# Patient Record
Sex: FEMALE | Race: WHITE | NOT HISPANIC OR LATINO | Employment: FULL TIME | URBAN - NONMETROPOLITAN AREA
[De-identification: names, ages, dates, MRNs, and addresses within clinical notes are randomized per-mention and may not be internally consistent; named-entity substitution may affect disease eponyms.]

---

## 2024-06-19 ENCOUNTER — APPOINTMENT (OUTPATIENT)
Dept: PRIMARY CARE | Facility: CLINIC | Age: 54
End: 2024-06-19
Payer: COMMERCIAL

## 2024-07-02 ENCOUNTER — APPOINTMENT (OUTPATIENT)
Dept: PRIMARY CARE | Facility: CLINIC | Age: 54
End: 2024-07-02
Payer: COMMERCIAL

## 2024-07-30 ENCOUNTER — HOSPITAL ENCOUNTER (INPATIENT)
Facility: HOSPITAL | Age: 54
LOS: 5 days | Discharge: HOME | End: 2024-08-04
Attending: EMERGENCY MEDICINE | Admitting: STUDENT IN AN ORGANIZED HEALTH CARE EDUCATION/TRAINING PROGRAM
Payer: COMMERCIAL

## 2024-07-30 ENCOUNTER — APPOINTMENT (OUTPATIENT)
Dept: VASCULAR MEDICINE | Facility: HOSPITAL | Age: 54
End: 2024-07-30
Payer: COMMERCIAL

## 2024-07-30 ENCOUNTER — APPOINTMENT (OUTPATIENT)
Dept: CARDIOLOGY | Facility: HOSPITAL | Age: 54
DRG: 299 | End: 2024-07-30
Payer: COMMERCIAL

## 2024-07-30 ENCOUNTER — APPOINTMENT (OUTPATIENT)
Dept: RADIOLOGY | Facility: HOSPITAL | Age: 54
DRG: 299 | End: 2024-07-30
Payer: COMMERCIAL

## 2024-07-30 DIAGNOSIS — I45.5 SINUS PAUSE: ICD-10-CM

## 2024-07-30 DIAGNOSIS — R60.0 LOCALIZED EDEMA: ICD-10-CM

## 2024-07-30 DIAGNOSIS — M79.605 PAIN IN LEFT LEG: ICD-10-CM

## 2024-07-30 DIAGNOSIS — I26.99 OTHER ACUTE PULMONARY EMBOLISM WITHOUT ACUTE COR PULMONALE (MULTI): Primary | ICD-10-CM

## 2024-07-30 DIAGNOSIS — I82.4Y2 ACUTE DEEP VEIN THROMBOSIS (DVT) OF PROXIMAL VEIN OF LEFT LOWER EXTREMITY (MULTI): ICD-10-CM

## 2024-07-30 LAB
ALBUMIN SERPL BCP-MCNC: 3.5 G/DL (ref 3.4–5)
ALBUMIN SERPL BCP-MCNC: 3.7 G/DL (ref 3.4–5)
ALP SERPL-CCNC: 88 U/L (ref 33–110)
ALP SERPL-CCNC: 89 U/L (ref 33–110)
ALT SERPL W P-5'-P-CCNC: 10 U/L (ref 7–45)
ALT SERPL W P-5'-P-CCNC: 11 U/L (ref 7–45)
ANION GAP SERPL CALC-SCNC: 10 MMOL/L (ref 10–20)
ANION GAP SERPL CALC-SCNC: 8 MMOL/L (ref 10–20)
APPEARANCE UR: CLEAR
AST SERPL W P-5'-P-CCNC: 11 U/L (ref 9–39)
AST SERPL W P-5'-P-CCNC: 12 U/L (ref 9–39)
ATRIAL RATE: 79 BPM
BILIRUB SERPL-MCNC: 0.4 MG/DL (ref 0–1.2)
BILIRUB SERPL-MCNC: 0.4 MG/DL (ref 0–1.2)
BILIRUB UR STRIP.AUTO-MCNC: NEGATIVE MG/DL
BNP SERPL-MCNC: 31 PG/ML (ref 0–99)
BUN SERPL-MCNC: 16 MG/DL (ref 6–23)
BUN SERPL-MCNC: 18 MG/DL (ref 6–23)
CALCIUM SERPL-MCNC: 10.7 MG/DL (ref 8.6–10.3)
CALCIUM SERPL-MCNC: 11.3 MG/DL (ref 8.6–10.3)
CARDIAC TROPONIN I PNL SERPL HS: 9 NG/L (ref 0–13)
CHLORIDE SERPL-SCNC: 108 MMOL/L (ref 98–107)
CHLORIDE SERPL-SCNC: 109 MMOL/L (ref 98–107)
CO2 SERPL-SCNC: 20 MMOL/L (ref 21–32)
CO2 SERPL-SCNC: 24 MMOL/L (ref 21–32)
COLOR UR: ABNORMAL
CREAT SERPL-MCNC: 0.92 MG/DL (ref 0.5–1.05)
CREAT SERPL-MCNC: 1.01 MG/DL (ref 0.5–1.05)
EGFRCR SERPLBLD CKD-EPI 2021: 67 ML/MIN/1.73M*2
EGFRCR SERPLBLD CKD-EPI 2021: 75 ML/MIN/1.73M*2
ERYTHROCYTE [DISTWIDTH] IN BLOOD BY AUTOMATED COUNT: 12.5 % (ref 11.5–14.5)
ERYTHROCYTE [DISTWIDTH] IN BLOOD BY AUTOMATED COUNT: 12.7 % (ref 11.5–14.5)
GLUCOSE SERPL-MCNC: 89 MG/DL (ref 74–99)
GLUCOSE SERPL-MCNC: 99 MG/DL (ref 74–99)
GLUCOSE UR STRIP.AUTO-MCNC: NORMAL MG/DL
HCT VFR BLD AUTO: 36.8 % (ref 36–46)
HCT VFR BLD AUTO: 37.4 % (ref 36–46)
HGB BLD-MCNC: 11.8 G/DL (ref 12–16)
HGB BLD-MCNC: 11.9 G/DL (ref 12–16)
INR PPP: 1.3 (ref 0.9–1.1)
KETONES UR STRIP.AUTO-MCNC: NEGATIVE MG/DL
LEUKOCYTE ESTERASE UR QL STRIP.AUTO: ABNORMAL
MAGNESIUM SERPL-MCNC: 1.94 MG/DL (ref 1.6–2.4)
MCH RBC QN AUTO: 29.5 PG (ref 26–34)
MCH RBC QN AUTO: 30 PG (ref 26–34)
MCHC RBC AUTO-ENTMCNC: 31.6 G/DL (ref 32–36)
MCHC RBC AUTO-ENTMCNC: 32.3 G/DL (ref 32–36)
MCV RBC AUTO: 93 FL (ref 80–100)
MCV RBC AUTO: 94 FL (ref 80–100)
MUCOUS THREADS #/AREA URNS AUTO: ABNORMAL /LPF
NITRITE UR QL STRIP.AUTO: NEGATIVE
NRBC BLD-RTO: 0 /100 WBCS (ref 0–0)
NRBC BLD-RTO: 0 /100 WBCS (ref 0–0)
P AXIS: 71 DEGREES
P OFFSET: 163 MS
P ONSET: 119 MS
PH UR STRIP.AUTO: 6.5 [PH]
PLATELET # BLD AUTO: 222 X10*3/UL (ref 150–450)
PLATELET # BLD AUTO: 252 X10*3/UL (ref 150–450)
POTASSIUM SERPL-SCNC: 3.9 MMOL/L (ref 3.5–5.3)
POTASSIUM SERPL-SCNC: 4 MMOL/L (ref 3.5–5.3)
PR INTERVAL: 202 MS
PROT SERPL-MCNC: 6.9 G/DL (ref 6.4–8.2)
PROT SERPL-MCNC: 7.2 G/DL (ref 6.4–8.2)
PROT UR STRIP.AUTO-MCNC: ABNORMAL MG/DL
PROTHROMBIN TIME: 14.7 SECONDS (ref 9.8–12.8)
Q ONSET: 220 MS
QRS COUNT: 12 BEATS
QRS DURATION: 90 MS
QT INTERVAL: 384 MS
QTC CALCULATION(BAZETT): 440 MS
QTC FREDERICIA: 421 MS
R AXIS: 89 DEGREES
RBC # BLD AUTO: 3.97 X10*6/UL (ref 4–5.2)
RBC # BLD AUTO: 4 X10*6/UL (ref 4–5.2)
RBC # UR STRIP.AUTO: ABNORMAL /UL
RBC #/AREA URNS AUTO: >20 /HPF
SODIUM SERPL-SCNC: 135 MMOL/L (ref 136–145)
SODIUM SERPL-SCNC: 136 MMOL/L (ref 136–145)
SP GR UR STRIP.AUTO: 1.03
SQUAMOUS #/AREA URNS AUTO: ABNORMAL /HPF
T AXIS: 66 DEGREES
T OFFSET: 412 MS
UFH PPP CHRO-ACNC: 0.9 IU/ML
UROBILINOGEN UR STRIP.AUTO-MCNC: NORMAL MG/DL
VENTRICULAR RATE: 79 BPM
WBC # BLD AUTO: 7.5 X10*3/UL (ref 4.4–11.3)
WBC # BLD AUTO: 7.8 X10*3/UL (ref 4.4–11.3)
WBC #/AREA URNS AUTO: ABNORMAL /HPF

## 2024-07-30 PROCEDURE — 99222 1ST HOSP IP/OBS MODERATE 55: CPT | Performed by: STUDENT IN AN ORGANIZED HEALTH CARE EDUCATION/TRAINING PROGRAM

## 2024-07-30 PROCEDURE — 85520 HEPARIN ASSAY: CPT | Performed by: STUDENT IN AN ORGANIZED HEALTH CARE EDUCATION/TRAINING PROGRAM

## 2024-07-30 PROCEDURE — 96374 THER/PROPH/DIAG INJ IV PUSH: CPT

## 2024-07-30 PROCEDURE — 1200000002 HC GENERAL ROOM WITH TELEMETRY DAILY

## 2024-07-30 PROCEDURE — 80053 COMPREHEN METABOLIC PANEL: CPT | Performed by: PHYSICIAN ASSISTANT

## 2024-07-30 PROCEDURE — 83036 HEMOGLOBIN GLYCOSYLATED A1C: CPT | Mod: SAMLAB | Performed by: STUDENT IN AN ORGANIZED HEALTH CARE EDUCATION/TRAINING PROGRAM

## 2024-07-30 PROCEDURE — 83880 ASSAY OF NATRIURETIC PEPTIDE: CPT | Performed by: PHYSICIAN ASSISTANT

## 2024-07-30 PROCEDURE — 84484 ASSAY OF TROPONIN QUANT: CPT | Performed by: PHYSICIAN ASSISTANT

## 2024-07-30 PROCEDURE — 36415 COLL VENOUS BLD VENIPUNCTURE: CPT | Performed by: PHYSICIAN ASSISTANT

## 2024-07-30 PROCEDURE — 96361 HYDRATE IV INFUSION ADD-ON: CPT

## 2024-07-30 PROCEDURE — 2500000004 HC RX 250 GENERAL PHARMACY W/ HCPCS (ALT 636 FOR OP/ED): Performed by: PHYSICIAN ASSISTANT

## 2024-07-30 PROCEDURE — 85610 PROTHROMBIN TIME: CPT | Performed by: PHYSICIAN ASSISTANT

## 2024-07-30 PROCEDURE — 93971 EXTREMITY STUDY: CPT | Performed by: INTERNAL MEDICINE

## 2024-07-30 PROCEDURE — 81001 URINALYSIS AUTO W/SCOPE: CPT | Performed by: PHYSICIAN ASSISTANT

## 2024-07-30 PROCEDURE — 2020000001 HC ICU ROOM DAILY

## 2024-07-30 PROCEDURE — 71275 CT ANGIOGRAPHY CHEST: CPT | Performed by: RADIOLOGY

## 2024-07-30 PROCEDURE — 2500000004 HC RX 250 GENERAL PHARMACY W/ HCPCS (ALT 636 FOR OP/ED): Performed by: STUDENT IN AN ORGANIZED HEALTH CARE EDUCATION/TRAINING PROGRAM

## 2024-07-30 PROCEDURE — 83735 ASSAY OF MAGNESIUM: CPT | Performed by: STUDENT IN AN ORGANIZED HEALTH CARE EDUCATION/TRAINING PROGRAM

## 2024-07-30 PROCEDURE — 93971 EXTREMITY STUDY: CPT

## 2024-07-30 PROCEDURE — 2550000001 HC RX 255 CONTRASTS: Performed by: PHYSICIAN ASSISTANT

## 2024-07-30 PROCEDURE — 93005 ELECTROCARDIOGRAM TRACING: CPT

## 2024-07-30 PROCEDURE — 87086 URINE CULTURE/COLONY COUNT: CPT | Mod: SAMLAB | Performed by: PHYSICIAN ASSISTANT

## 2024-07-30 PROCEDURE — 71275 CT ANGIOGRAPHY CHEST: CPT

## 2024-07-30 PROCEDURE — 85027 COMPLETE CBC AUTOMATED: CPT | Performed by: PHYSICIAN ASSISTANT

## 2024-07-30 PROCEDURE — 80053 COMPREHEN METABOLIC PANEL: CPT | Performed by: STUDENT IN AN ORGANIZED HEALTH CARE EDUCATION/TRAINING PROGRAM

## 2024-07-30 PROCEDURE — 99285 EMERGENCY DEPT VISIT HI MDM: CPT | Mod: 25

## 2024-07-30 PROCEDURE — 85027 COMPLETE CBC AUTOMATED: CPT | Performed by: STUDENT IN AN ORGANIZED HEALTH CARE EDUCATION/TRAINING PROGRAM

## 2024-07-30 RX ORDER — CEFTRIAXONE 1 G/50ML
1 INJECTION, SOLUTION INTRAVENOUS EVERY 24 HOURS
Status: DISCONTINUED | OUTPATIENT
Start: 2024-07-30 | End: 2024-07-31

## 2024-07-30 RX ORDER — SODIUM CHLORIDE 9 MG/ML
125 INJECTION, SOLUTION INTRAVENOUS CONTINUOUS
Status: DISCONTINUED | OUTPATIENT
Start: 2024-07-30 | End: 2024-08-01

## 2024-07-30 RX ORDER — IBUPROFEN 200 MG
400 TABLET ORAL EVERY 6 HOURS
COMMUNITY
End: 2024-08-04 | Stop reason: HOSPADM

## 2024-07-30 RX ORDER — TAMSULOSIN HYDROCHLORIDE 0.4 MG/1
0.4 CAPSULE ORAL DAILY
Status: DISCONTINUED | OUTPATIENT
Start: 2024-07-31 | End: 2024-08-04 | Stop reason: HOSPADM

## 2024-07-30 RX ORDER — TAMSULOSIN HYDROCHLORIDE 0.4 MG/1
0.4 CAPSULE ORAL DAILY
COMMUNITY

## 2024-07-30 RX ORDER — POLYETHYLENE GLYCOL 3350 17 G/17G
17 POWDER, FOR SOLUTION ORAL DAILY
Status: DISCONTINUED | OUTPATIENT
Start: 2024-07-30 | End: 2024-08-04 | Stop reason: HOSPADM

## 2024-07-30 RX ORDER — HEPARIN SODIUM 10000 [USP'U]/100ML
0-4500 INJECTION, SOLUTION INTRAVENOUS CONTINUOUS
Status: DISPENSED | OUTPATIENT
Start: 2024-07-30 | End: 2024-08-01

## 2024-07-30 RX ADMIN — CEFTRIAXONE SODIUM 1 G: 1 INJECTION, SOLUTION INTRAVENOUS at 19:21

## 2024-07-30 RX ADMIN — HEPARIN SODIUM 1200 UNITS/HR: 10000 INJECTION, SOLUTION INTRAVENOUS at 16:41

## 2024-07-30 RX ADMIN — IOHEXOL 68 ML: 350 INJECTION, SOLUTION INTRAVENOUS at 15:29

## 2024-07-30 RX ADMIN — SODIUM CHLORIDE 500 ML: 9 INJECTION, SOLUTION INTRAVENOUS at 14:34

## 2024-07-30 RX ADMIN — SODIUM CHLORIDE 125 ML/HR: 9 INJECTION, SOLUTION INTRAVENOUS at 19:21

## 2024-07-30 SDOH — SOCIAL STABILITY: SOCIAL INSECURITY: ARE THERE ANY APPARENT SIGNS OF INJURIES/BEHAVIORS THAT COULD BE RELATED TO ABUSE/NEGLECT?: NO

## 2024-07-30 SDOH — SOCIAL STABILITY: SOCIAL INSECURITY: HAVE YOU HAD THOUGHTS OF HARMING ANYONE ELSE?: NO

## 2024-07-30 SDOH — SOCIAL STABILITY: SOCIAL INSECURITY: ARE YOU OR HAVE YOU BEEN THREATENED OR ABUSED PHYSICALLY, EMOTIONALLY, OR SEXUALLY BY ANYONE?: NO

## 2024-07-30 SDOH — SOCIAL STABILITY: SOCIAL INSECURITY: WERE YOU ABLE TO COMPLETE ALL THE BEHAVIORAL HEALTH SCREENINGS?: YES

## 2024-07-30 SDOH — SOCIAL STABILITY: SOCIAL INSECURITY: DO YOU FEEL UNSAFE GOING BACK TO THE PLACE WHERE YOU ARE LIVING?: NO

## 2024-07-30 SDOH — SOCIAL STABILITY: SOCIAL INSECURITY: HAS ANYONE EVER THREATENED TO HURT YOUR FAMILY OR YOUR PETS?: NO

## 2024-07-30 SDOH — SOCIAL STABILITY: SOCIAL INSECURITY: ABUSE: ADULT

## 2024-07-30 SDOH — SOCIAL STABILITY: SOCIAL INSECURITY: HAVE YOU HAD ANY THOUGHTS OF HARMING ANYONE ELSE?: NO

## 2024-07-30 SDOH — SOCIAL STABILITY: SOCIAL INSECURITY: DO YOU FEEL ANYONE HAS EXPLOITED OR TAKEN ADVANTAGE OF YOU FINANCIALLY OR OF YOUR PERSONAL PROPERTY?: NO

## 2024-07-30 SDOH — SOCIAL STABILITY: SOCIAL INSECURITY: POSSIBLE ABUSE REPORTED TO:: OTHER (COMMENT)

## 2024-07-30 SDOH — HEALTH STABILITY: MENTAL HEALTH: EXPERIENCED ANY OF THE FOLLOWING LIFE EVENTS: OTHER (COMMENT)

## 2024-07-30 SDOH — SOCIAL STABILITY: SOCIAL INSECURITY: DOES ANYONE TRY TO KEEP YOU FROM HAVING/CONTACTING OTHER FRIENDS OR DOING THINGS OUTSIDE YOUR HOME?: NO

## 2024-07-30 ASSESSMENT — COGNITIVE AND FUNCTIONAL STATUS - GENERAL
MOBILITY SCORE: 24
DAILY ACTIVITIY SCORE: 24
MOBILITY SCORE: 24
DAILY ACTIVITIY SCORE: 24
PATIENT BASELINE BEDBOUND: NO

## 2024-07-30 ASSESSMENT — ACTIVITIES OF DAILY LIVING (ADL)
ADEQUATE_TO_COMPLETE_ADL: YES
DRESSING YOURSELF: INDEPENDENT
JUDGMENT_ADEQUATE_SAFELY_COMPLETE_DAILY_ACTIVITIES: YES
HEARING - RIGHT EAR: FUNCTIONAL
TOILETING: INDEPENDENT
WALKS IN HOME: INDEPENDENT
GROOMING: INDEPENDENT
FEEDING YOURSELF: INDEPENDENT
LACK_OF_TRANSPORTATION: NO
TOILETING: INDEPENDENT
PATIENT'S MEMORY ADEQUATE TO SAFELY COMPLETE DAILY ACTIVITIES?: YES
HEARING - LEFT EAR: FUNCTIONAL
GROOMING: INDEPENDENT
BATHING: INDEPENDENT
DRESSING YOURSELF: INDEPENDENT
ADEQUATE_TO_COMPLETE_ADL: YES
WALKS IN HOME: INDEPENDENT
BATHING: INDEPENDENT
FEEDING YOURSELF: INDEPENDENT
PATIENT'S MEMORY ADEQUATE TO SAFELY COMPLETE DAILY ACTIVITIES?: YES
JUDGMENT_ADEQUATE_SAFELY_COMPLETE_DAILY_ACTIVITIES: YES
HEARING - RIGHT EAR: FUNCTIONAL

## 2024-07-30 ASSESSMENT — PAIN - FUNCTIONAL ASSESSMENT
PAIN_FUNCTIONAL_ASSESSMENT: 0-10
PAIN_FUNCTIONAL_ASSESSMENT: 0-10

## 2024-07-30 ASSESSMENT — PAIN SCALES - GENERAL
PAINLEVEL_OUTOF10: 0 - NO PAIN
PAINLEVEL_OUTOF10: 4

## 2024-07-30 ASSESSMENT — LIFESTYLE VARIABLES
HOW OFTEN DO YOU HAVE A DRINK CONTAINING ALCOHOL: NEVER
SUBSTANCE_ABUSE_PAST_12_MONTHS: NO
HOW OFTEN DO YOU HAVE 6 OR MORE DRINKS ON ONE OCCASION: NEVER
AUDIT-C TOTAL SCORE: 0
PRESCIPTION_ABUSE_PAST_12_MONTHS: NO
SKIP TO QUESTIONS 9-10: 1
AUDIT-C TOTAL SCORE: 0
HOW MANY STANDARD DRINKS CONTAINING ALCOHOL DO YOU HAVE ON A TYPICAL DAY: PATIENT DOES NOT DRINK

## 2024-07-30 ASSESSMENT — PAIN DESCRIPTION - LOCATION: LOCATION: LEG

## 2024-07-30 ASSESSMENT — PATIENT HEALTH QUESTIONNAIRE - PHQ9
SUM OF ALL RESPONSES TO PHQ9 QUESTIONS 1 & 2: 0
2. FEELING DOWN, DEPRESSED OR HOPELESS: NOT AT ALL
1. LITTLE INTEREST OR PLEASURE IN DOING THINGS: NOT AT ALL

## 2024-07-30 ASSESSMENT — PAIN DESCRIPTION - FREQUENCY: FREQUENCY: CONSTANT/CONTINUOUS

## 2024-07-30 ASSESSMENT — PAIN DESCRIPTION - ORIENTATION: ORIENTATION: LEFT

## 2024-07-30 ASSESSMENT — PAIN DESCRIPTION - PAIN TYPE: TYPE: ACUTE PAIN

## 2024-07-30 ASSESSMENT — COLUMBIA-SUICIDE SEVERITY RATING SCALE - C-SSRS
6. HAVE YOU EVER DONE ANYTHING, STARTED TO DO ANYTHING, OR PREPARED TO DO ANYTHING TO END YOUR LIFE?: NO
2. HAVE YOU ACTUALLY HAD ANY THOUGHTS OF KILLING YOURSELF?: NO
1. IN THE PAST MONTH, HAVE YOU WISHED YOU WERE DEAD OR WISHED YOU COULD GO TO SLEEP AND NOT WAKE UP?: NO

## 2024-07-30 ASSESSMENT — PAIN DESCRIPTION - DESCRIPTORS: DESCRIPTORS: TIGHTNESS

## 2024-07-30 NOTE — ED PROVIDER NOTES
"aHPI   Chief Complaint   Patient presents with    Leg Pain     Pt is c/o left leg pain and swelling of the calf. The left leg is also warm.        Patient presents with left leg swelling.  She states it is sore in the calf.  She noticed this the day after she got out of the hospital after having a urologic procedure.  Gradually worsening.  States she did feel short of breath a few days ago but none now.  She thought she just had a little bit of allergies.  Patient denies chest pain.  No nauseous or vomiting.  Denies any fall or injury.  States the leg feels \"achy.\"  She states the pain is mostly in the calf but does somewhat radiate up the thigh.      History provided by:  Patient          Patient History   No past medical history on file.  No past surgical history on file.  No family history on file.  Social History     Tobacco Use    Smoking status: Former     Types: Cigarettes    Smokeless tobacco: Never   Substance Use Topics    Alcohol use: Not on file    Drug use: Not on file       Physical Exam   ED Triage Vitals [07/30/24 1334]   Temperature Heart Rate Respirations BP   36.3 °C (97.4 °F) 78 16 132/79      Pulse Ox Temp Source Heart Rate Source Patient Position   97 % Tympanic Monitor Sitting      BP Location FiO2 (%)     Left arm --       Physical Exam  Vitals and nursing note reviewed.   Constitutional:       General: She is not in acute distress.     Appearance: Normal appearance. She is well-developed, well-groomed and normal weight. She is not ill-appearing or toxic-appearing.   HENT:      Head: Normocephalic.      Right Ear: External ear normal.      Left Ear: External ear normal.      Nose: Nose normal.      Mouth/Throat:      Mouth: Mucous membranes are moist.   Eyes:      General: No scleral icterus.     Conjunctiva/sclera: Conjunctivae normal.   Neck:      Vascular: No JVD.   Cardiovascular:      Rate and Rhythm: Normal rate and regular rhythm.      Pulses:           Dorsalis pedis pulses are 2+ on " the right side and 2+ on the left side.        Posterior tibial pulses are 2+ on the right side and 2+ on the left side.      Heart sounds: Normal heart sounds.   Pulmonary:      Effort: Pulmonary effort is normal.      Breath sounds: Normal breath sounds and air entry.   Chest:      Chest wall: No tenderness.   Musculoskeletal:      Left lower leg: Tenderness present. Edema present.        Legs:    Skin:     General: Skin is warm.      Capillary Refill: Capillary refill takes less than 2 seconds.   Neurological:      General: No focal deficit present.      Mental Status: She is alert and oriented to person, place, and time.      Cranial Nerves: No cranial nerve deficit or facial asymmetry.      Sensory: No sensory deficit.      Motor: No weakness.   Psychiatric:         Attention and Perception: Attention and perception normal.         Mood and Affect: Mood and affect normal.         Speech: Speech normal.         Behavior: Behavior normal. Behavior is cooperative.         Thought Content: Thought content normal.         Cognition and Memory: Cognition and memory normal.         Judgment: Judgment normal.           ED Course & MDM   ED Course as of 08/02/24 0912   Tue Jul 30, 2024   1428 +DVT []   1640 Dr joshi [facility] team fellow []   [address] []      ED Course User Index  [] Lenora Olguin PA-C         Diagnoses as of 08/02/24 0912   Other acute pulmonary embolism without acute cor pulmonale (Multi)   Acute deep vein thrombosis (DVT) of proximal vein of left lower extremity (Multi)                       No data recorded                        Medical Decision Making  Patient presents with left leg swelling.  She states it is sore in the calf.  She noticed this the day after she got out of the hospital after having a urologic procedure.  Gradually worsening.  States she did feel short of breath a few days ago but none now.  She thought she just had a little bit of allergies.  Patient denies chest pain.   "No nauseous or vomiting.  Denies any fall or injury.  States the leg feels \"achy.\"  She states the pain is mostly in the calf but does somewhat radiate up the thigh.    Ddx: DVT, strain, sprain, infection, other    Initially started off with an ultrasound to rule out DVT.  Radiologist read this is positive DVT fairly extensive.  This patient did mention that she had been short of breath a few days ago we will obtain a CT of the chest.  Including labs as we will need to start anticoagulants regardless of whether there is a PE.  Unfortunately there was bilateral PE on patient's CT.  No evidence of right heart strain.  Consulted with the PERT team.  Patient is stable for admission here at this hospital and does not meet criteria for procedures.  Heparin drip started.  Consult to hospitalist for admission with acceptance.    The patient was seen by the advanced practice provider.  I have personally performed a substantive portion of the encounter.  I have seen and examined the patient; agree with the workup, evaluation, MDM, management and diagnosis.  The care plan has been discussed.    I personally saw the patient and made/approved the management plan and take responsibility for the patient's management.   History: Left leg swelling and pain.  There is 53-year-old white female presented to the ED due to pain and swelling of her left lower extremity she states that she noted this after having an urologic procedure and states that the swelling and pain has gotten worse since then.  She did complain of an episode of shortness of breath a few days ago but states that that has since resolved.  She denies any chest pain.  She describes the pain of her leg as an achy type pain primarily in the calf but does radiate up into the thigh.  Exam: Gen: Alert and oriented x 3 appears to be no acute distress nontoxic pleasant cooperative to evaluation.  ENT examinations unremarkable.  Neck is supple without meningismus.  Heart " regular rate and rhythm without murmurs.  Lungs are clear to auscultation bilaterally respirations are easy and symmetrical without retractions or tachypnea.  Evaluation left lower extremity with distal pulses are +2/4 and present at the dorsalis pedis and tibialis.  Cap refill less than 2 seconds.  Patient does have some tenderness and edema of the left lower extremity versus the right.  No focal neurologic deficits appreciated.  MDM: Patient was seen and evaluated due to concern for possible deep vein thrombosis.  Patient had venous duplex Doppler performed that was positive for deep vein thrombosis subsequently patient was further worked up for possible pulmonary embolism with blood work and CTA of the chest.  The CTA of the chest did not reveal any evidence of right heart strain and patient was started on heparin drip and admitted to the hospital for treatment  Jorge Jean DO         Problems Addressed:  Acute deep vein thrombosis (DVT) of proximal vein of left lower extremity (Multi): undiagnosed new problem with uncertain prognosis  Other acute pulmonary embolism without acute cor pulmonale (Multi): undiagnosed new problem with uncertain prognosis    Amount and/or Complexity of Data Reviewed  Labs: ordered. Decision-making details documented in ED Course.  Radiology: ordered and independent interpretation performed. Decision-making details documented in ED Course.  ECG/medicine tests: ordered and independent interpretation performed. Decision-making details documented in ED Course.     Details: Read by myself and attending showing normal sinus rhythm at a ventricular rate of 79 bpm.  Normal axis.  No ST segment elevation.  GA interval of 202 with a QT of 384        Procedure  Critical Care    Performed by: Jorge Jean DO  Authorized by: Jorge Jean DO    Critical care provider statement:     Critical care time (minutes):  40    Critical care time was exclusive of:  Separately billable  procedures and treating other patients    Critical care was necessary to treat or prevent imminent or life-threatening deterioration of the following conditions: Pulmonary embolism.    Critical care was time spent personally by me on the following activities:  Discussions with consultants, development of treatment plan with patient or surrogate, examination of patient, evaluation of patient's response to treatment, discussions with primary provider, obtaining history from patient or surrogate, ordering and review of laboratory studies, ordering and review of radiographic studies, pulse oximetry, re-evaluation of patient's condition and review of old charts    Care discussed with: admitting provider         Lenora Olguin PA-C  07/30/24 1657       Jorge Jean,   08/02/24 0912       Jorge Jean,   08/29/24 0700

## 2024-07-30 NOTE — H&P
History Of Present Illness  Jolly Alvarado is a 53 y.o. female with past medical history of recurrent nephrolithiasis, recurrent UTI, ex-smoker 20 years ago, on hormonal injections for menstrual cycle presents with complaints of left calf pain for 1 and half weeks.  Patient says she had like a myalgia like pain that got worsened after a recent trip to [place].  She has been having some episodes of shortness of breast last weekend.  Yesterday with her leg pain was worse and she came to the ER today for further workup.  To be noted patient has been having outpatient kidney stone removal procedures for her recurrent UTI.  She does not have any symptoms of UTI now but says she has been on antibiotics in the last 10 days.  No complaints of chest pain or palpitations.  No hypotension  Oxygen arrival in the ER Patient had a blood pressure of 132/70 mmHg, heart rate 74, respirate 16/min saturating 97% on room air.  Initial investigations showed glucose 99, sodium 136, potassium 4, bicarb 24, anion gap 8, creatinine 1.01, calcium 11.3 [elevated], hemoglobin 11.9, CBC otherwise unremarkable, urinalysis showed leukocyte Estrace and protein.  A CT angio chest was performed shows bilateral extensive pulmonary embolism.  Bibasilar has space disease and pulmonary nodules.  There is also a low-attenuation lesion in the uncinate process of the pancreas.  A lower extremity duplex was done showed left lower extremity acute occlusive deep vein thrombosis in the mid and distal femoral, popliteal and posterior tibial and gastrocnemius veins.  And nonocclusive DVT in proximal femoral and mid femoral peroneal veins.    Past Medical History  Nephrolithiasis  Surgical History  No past surgical history on file.     Social History  She reports that she has quit smoking. Her smoking use included cigarettes. She has never used smokeless tobacco. No history on file for alcohol use and drug use.    Family History  No family history on file.    "  Allergies  Levofloxacin    Review of Systems     Physical Exam  Constitutional:       Appearance: Normal appearance. She is normal weight.   HENT:      Head: Normocephalic and atraumatic.      Right Ear: Tympanic membrane normal.      Nose: Nose normal.      Mouth/Throat:      Mouth: Mucous membranes are moist.   Eyes:      Pupils: Pupils are equal, round, and reactive to light.   Cardiovascular:      Rate and Rhythm: Normal rate.      Pulses: Normal pulses.   Pulmonary:      Effort: Pulmonary effort is normal.   Abdominal:      Palpations: Abdomen is soft.   Musculoskeletal:      Cervical back: Normal range of motion.      Comments: Left calf tenderness   Skin:     General: Skin is dry.   Neurological:      General: No focal deficit present.      Mental Status: She is alert.          Last Recorded Vitals  Blood pressure 139/76, pulse 86, temperature 36.3 °C (97.4 °F), temperature source Tympanic, resp. rate 18, height 1.651 m (5' 5\"), weight 68.5 kg (151 lb), SpO2 100%.    Relevant Results        Scheduled medications  cefTRIAXone, 1 g, intravenous, q24h  polyethylene glycol, 17 g, oral, Daily  tamsulosin, 0.4 mg, oral, Daily      Continuous medications  heparin, 0-4,500 Units/hr, Last Rate: 1,200 Units/hr (07/30/24 1641)  sodium chloride 0.9%, 125 mL/hr      PRN medications  PRN medications: heparin  Results for orders placed or performed during the hospital encounter of 07/30/24 (from the past 24 hour(s))   Protime-INR   Result Value Ref Range    Protime 14.7 (H) 9.8 - 12.8 seconds    INR 1.3 (H) 0.9 - 1.1   Comprehensive metabolic panel   Result Value Ref Range    Glucose 99 74 - 99 mg/dL    Sodium 136 136 - 145 mmol/L    Potassium 4.0 3.5 - 5.3 mmol/L    Chloride 108 (H) 98 - 107 mmol/L    Bicarbonate 24 21 - 32 mmol/L    Anion Gap 8 (L) 10 - 20 mmol/L    Urea Nitrogen 18 6 - 23 mg/dL    Creatinine 1.01 0.50 - 1.05 mg/dL    eGFR 67 >60 mL/min/1.73m*2    Calcium 11.3 (H) 8.6 - 10.3 mg/dL    Albumin 3.7 3.4 - " 5.0 g/dL    Alkaline Phosphatase 88 33 - 110 U/L    Total Protein 7.2 6.4 - 8.2 g/dL    AST 12 9 - 39 U/L    Bilirubin, Total 0.4 0.0 - 1.2 mg/dL    ALT 11 7 - 45 U/L   CBC   Result Value Ref Range    WBC 7.5 4.4 - 11.3 x10*3/uL    nRBC 0.0 0.0 - 0.0 /100 WBCs    RBC 3.97 (L) 4.00 - 5.20 x10*6/uL    Hemoglobin 11.9 (L) 12.0 - 16.0 g/dL    Hematocrit 36.8 36.0 - 46.0 %    MCV 93 80 - 100 fL    MCH 30.0 26.0 - 34.0 pg    MCHC 32.3 32.0 - 36.0 g/dL    RDW 12.7 11.5 - 14.5 %    Platelets 222 150 - 450 x10*3/uL   ECG 12 lead   Result Value Ref Range    Ventricular Rate 79 BPM    Atrial Rate 79 BPM    DE Interval 202 ms    QRS Duration 90 ms    QT Interval 384 ms    QTC Calculation(Bazett) 440 ms    P Axis 71 degrees    R Axis 89 degrees    T Axis 66 degrees    QRS Count 12 beats    Q Onset 220 ms    P Onset 119 ms    P Offset 163 ms    T Offset 412 ms    QTC Fredericia 421 ms   Urinalysis with Reflex Culture and Microscopic   Result Value Ref Range    Color, Urine Light-Yellow Light-Yellow, Yellow, Dark-Yellow    Appearance, Urine Clear Clear    Specific Gravity, Urine 1.035 1.005 - 1.035    pH, Urine 6.5 5.0, 5.5, 6.0, 6.5, 7.0, 7.5, 8.0    Protein, Urine 30 (1+) (A) NEGATIVE, 10 (TRACE), 20 (TRACE) mg/dL    Glucose, Urine Normal Normal mg/dL    Blood, Urine 0.2 (2+) (A) NEGATIVE    Ketones, Urine NEGATIVE NEGATIVE mg/dL    Bilirubin, Urine NEGATIVE NEGATIVE    Urobilinogen, Urine Normal Normal mg/dL    Nitrite, Urine NEGATIVE NEGATIVE    Leukocyte Esterase, Urine 500 Mike/µL (A) NEGATIVE   Microscopic Only, Urine   Result Value Ref Range    WBC, Urine 11-20 (A) 1-5, NONE /HPF    RBC, Urine >20 (A) NONE, 1-2, 3-5 /HPF    Squamous Epithelial Cells, Urine 1-9 (SPARSE) Reference range not established. /HPF    Mucus, Urine FEW Reference range not established. /LPF   B-Type Natriuretic Peptide   Result Value Ref Range    BNP 31 0 - 99 pg/mL   Troponin I, High Sensitivity   Result Value Ref Range    Troponin I, High  Sensitivity 9 0 - 13 ng/L     ECG 12 lead    Result Date: 7/30/2024  Normal sinus rhythm Normal ECG No previous ECGs available    CT angio chest for pulmonary embolism      1. Positive for extensive bilateral pulmonary embolism although no stigmata of ventricular strain. 2. Bibasilar airspace disease.  Correlation with auscultation and inflammatory markers recommended. No pleural effusion. 3. Miniscule pulmonary nodules. 4. Low-attenuation lesion involving the uncinate process of the pancreas not entirely within the field of view. Additional imaging is warranted.   MACRO: Yordan Verduzco discussed the significance and urgency of this critical finding by message with  VANDA GONZALEZ on 7/30/2024 at 4:12 pm. (**-RCF-**) Findings:  See findings.   Signed by: Yordan Verduzco 7/30/2024 4:14 PM Dictation workstation:   [id number]    Lower extremity venous duplex left  Right Lower Venous: The right common femoral vein demonstrates normal spontaneous and respirophasic flow. Left Lower Venous: There is acute occlusive deep vein thrombosis visualized in the mid femoral, distal femoral, popliteal, posterior tibial and gastrocnemius veins. There is acute non-occlusive deep vein thrombosis visualized in the proximal femoral, mid femoral and peroneal veins.  Imaging &      Assessment/Plan   53-year-old female with past medical history of recurrent kidney stones, recurrent UTI, with hormonal injection with deep port preparation presents with acute bilateral extensive pulmonary embolism with acute occlusive DVT of left lower extremity.  Patient also has acute UTI    Plan  Admit to inpatient  Telemetry monitoring  Patient started on IV heparin drip  Cardiology consult  Will obtain echocardiogram  Obtain urine culture  Will start the patient on ceftriaxone 1 g daily  IV hydration  Plan to transition to oral anticoagulants   Patient also informed about other inflammation on the CT scan.  She has some pulmonary nodules but has to be  followed up as outpatient.  There are some achiness in the uncinate process of pancreas recommend to follow-up as outpatient as well    DVT prophylaxis: Heparin  GI prophylaxis: Not required  Full code       I spent 60 minutes in the professional and overall care of this patient.      Cuba Mcbride MD

## 2024-07-30 NOTE — SIGNIFICANT EVENT
"PULMONARY EMBOLISM RESPONSE TEAM (PERT) NOTE    This note represents a summary of a virtual evaluation by the pulmonary embolism response team requested by Lenora FLORES.  Suggestions and impression are summarized from the initial virtual encounter and discussion with the referring medical team. These suggestions supplement but should not be a substitute for bedside evaluation and management by the attending of record.     PERT Members on the Call:  Critical Care Attending: Dr. Us  Critical Care Fellow: Dr. Adames      Brief Clinical Summary:  Jolly Alvarado is a 53 y.o. female with PMH kidney stones with recent ureteral stent placement in mid July, presenting with LLE swelling and pain. She was found to have a LLE femoral vein DVT. Given some SOB 3 days prior to admission (none currently) pt underwent CTPE which revealed bilateral main PA pulmonary emboli. Patient has no respiratory symptoms and remains hemodynamically stable.     Vital Signs  /81 (BP Location: Left arm, Patient Position: Sitting)   Pulse 77   Temp 36.3 °C (97.4 °F) (Tympanic)   Resp 16   Ht 1.651 m (5' 5\")   Wt 67.1 kg (148 lb)   SpO2 100%   BMI 24.63 kg/m²      Laboratory  No results for input(s): \"TROPHS\", \"BNP\", \"LACTATE\" in the last 05803 hours.      PESI Score Star MAO Et al. Arch Intern Med. 2010;[id number].           PESI Score:  52, consistent with JLPESIRISK: Class I, or Very Low risk (0-1.6% 30-day mortality risk) PE.    CT Scan reviewed:  Clot burden moderate, present in bilat main PA  RV/LV ratio 0.8/1 by CT scan    TTE reviewed (if available):  N/A    Venous duplex (if available):  LLE femoral DVT present    Contraindications to anticoagulation: no - on heparin gtt  Contraindications to thrombolytics: no    Initial Impressions:  ERS/ESC Pulmonary Embolism Risk Category: JLPECLASS: Low risk.  Submassive PE    Initial Suggestions/Plans:  Admit to telemetry unit at [facility]  Initial therapy suggested: " heparin gtt.  Additional testing recommended: formal TTE, troponin, BNP, lactate  Consults suggested: local cardiology or pulmonary consult to assist with management of PE.  Additional suggestions for re-evaluation/reconference or retesting: if worsening hemodynamics or respiratory status, please re engage the PERT team.    To re conference the PERT team please call the  Transfer Center at [phone].

## 2024-07-30 NOTE — CARE PLAN
Problem: Skin  Goal: Participates in plan/prevention/treatment measures  Outcome: Progressing  Flowsheets (Taken 7/30/2024 0929)  Participates in plan/prevention/treatment measures: Elevate heels

## 2024-07-31 ENCOUNTER — APPOINTMENT (OUTPATIENT)
Dept: CARDIOLOGY | Facility: HOSPITAL | Age: 54
DRG: 299 | End: 2024-07-31
Payer: COMMERCIAL

## 2024-07-31 ENCOUNTER — APPOINTMENT (OUTPATIENT)
Dept: CARDIOLOGY | Facility: HOSPITAL | Age: 54
End: 2024-07-31
Payer: COMMERCIAL

## 2024-07-31 LAB
ALBUMIN SERPL BCP-MCNC: 3.2 G/DL (ref 3.4–5)
ALP SERPL-CCNC: 78 U/L (ref 33–110)
ALT SERPL W P-5'-P-CCNC: 8 U/L (ref 7–45)
ANION GAP SERPL CALC-SCNC: 8 MMOL/L (ref 10–20)
AORTIC VALVE MEAN GRADIENT: 6 MMHG
AORTIC VALVE PEAK VELOCITY: 1.77 M/S
AST SERPL W P-5'-P-CCNC: 10 U/L (ref 9–39)
ATRIAL RATE: 72 BPM
AV PEAK GRADIENT: 12.5 MMHG
AVA (PEAK VEL): 1.37 CM2
AVA (VTI): 1.4 CM2
BACTERIA UR CULT: NORMAL
BILIRUB SERPL-MCNC: 0.3 MG/DL (ref 0–1.2)
BUN SERPL-MCNC: 15 MG/DL (ref 6–23)
CALCIUM SERPL-MCNC: 10.5 MG/DL (ref 8.6–10.3)
CHLORIDE SERPL-SCNC: 111 MMOL/L (ref 98–107)
CO2 SERPL-SCNC: 20 MMOL/L (ref 21–32)
CREAT SERPL-MCNC: 0.95 MG/DL (ref 0.5–1.05)
EGFRCR SERPLBLD CKD-EPI 2021: 72 ML/MIN/1.73M*2
EJECTION FRACTION APICAL 4 CHAMBER: 62.9
EJECTION FRACTION: 59 %
ERYTHROCYTE [DISTWIDTH] IN BLOOD BY AUTOMATED COUNT: 12.8 % (ref 11.5–14.5)
EST. AVERAGE GLUCOSE BLD GHB EST-MCNC: 100 MG/DL
GLUCOSE SERPL-MCNC: 95 MG/DL (ref 74–99)
HBA1C MFR BLD: 5.1 %
HCT VFR BLD AUTO: 33 % (ref 36–46)
HGB BLD-MCNC: 10.4 G/DL (ref 12–16)
HOLD SPECIMEN: NORMAL
LEFT ATRIUM VOLUME AREA LENGTH INDEX BSA: 16.2 ML/M2
LEFT VENTRICLE INTERNAL DIMENSION DIASTOLE: 3.42 CM (ref 3.5–6)
LEFT VENTRICULAR OUTFLOW TRACT DIAMETER: 1.6 CM
LV EJECTION FRACTION BIPLANE: 59 %
MCH RBC QN AUTO: 29.7 PG (ref 26–34)
MCHC RBC AUTO-ENTMCNC: 31.5 G/DL (ref 32–36)
MCV RBC AUTO: 94 FL (ref 80–100)
MITRAL VALVE E/A RATIO: 0.94
NRBC BLD-RTO: 0 /100 WBCS (ref 0–0)
P AXIS: 58 DEGREES
P OFFSET: 150 MS
P ONSET: 111 MS
PLATELET # BLD AUTO: 205 X10*3/UL (ref 150–450)
POTASSIUM SERPL-SCNC: 3.9 MMOL/L (ref 3.5–5.3)
PR INTERVAL: 218 MS
PROT SERPL-MCNC: 6.1 G/DL (ref 6.4–8.2)
Q ONSET: 220 MS
QRS COUNT: 12 BEATS
QRS DURATION: 84 MS
QT INTERVAL: 394 MS
QTC CALCULATION(BAZETT): 431 MS
QTC FREDERICIA: 418 MS
R AXIS: 49 DEGREES
RBC # BLD AUTO: 3.5 X10*6/UL (ref 4–5.2)
SODIUM SERPL-SCNC: 135 MMOL/L (ref 136–145)
T AXIS: 56 DEGREES
T OFFSET: 417 MS
TRICUSPID ANNULAR PLANE SYSTOLIC EXCURSION: 1.6 CM
UFH PPP CHRO-ACNC: 0.6 IU/ML
UFH PPP CHRO-ACNC: 0.7 IU/ML
VENTRICULAR RATE: 72 BPM
WBC # BLD AUTO: 7.4 X10*3/UL (ref 4.4–11.3)

## 2024-07-31 PROCEDURE — 36415 COLL VENOUS BLD VENIPUNCTURE: CPT | Performed by: STUDENT IN AN ORGANIZED HEALTH CARE EDUCATION/TRAINING PROGRAM

## 2024-07-31 PROCEDURE — 93005 ELECTROCARDIOGRAM TRACING: CPT

## 2024-07-31 PROCEDURE — 93306 TTE W/DOPPLER COMPLETE: CPT

## 2024-07-31 PROCEDURE — 2500000002 HC RX 250 W HCPCS SELF ADMINISTERED DRUGS (ALT 637 FOR MEDICARE OP, ALT 636 FOR OP/ED): Performed by: STUDENT IN AN ORGANIZED HEALTH CARE EDUCATION/TRAINING PROGRAM

## 2024-07-31 PROCEDURE — 85520 HEPARIN ASSAY: CPT | Performed by: STUDENT IN AN ORGANIZED HEALTH CARE EDUCATION/TRAINING PROGRAM

## 2024-07-31 PROCEDURE — 2500000004 HC RX 250 GENERAL PHARMACY W/ HCPCS (ALT 636 FOR OP/ED): Performed by: STUDENT IN AN ORGANIZED HEALTH CARE EDUCATION/TRAINING PROGRAM

## 2024-07-31 PROCEDURE — 1200000002 HC GENERAL ROOM WITH TELEMETRY DAILY

## 2024-07-31 PROCEDURE — 84075 ASSAY ALKALINE PHOSPHATASE: CPT | Performed by: STUDENT IN AN ORGANIZED HEALTH CARE EDUCATION/TRAINING PROGRAM

## 2024-07-31 PROCEDURE — 99223 1ST HOSP IP/OBS HIGH 75: CPT

## 2024-07-31 PROCEDURE — 93306 TTE W/DOPPLER COMPLETE: CPT | Performed by: STUDENT IN AN ORGANIZED HEALTH CARE EDUCATION/TRAINING PROGRAM

## 2024-07-31 PROCEDURE — 99232 SBSQ HOSP IP/OBS MODERATE 35: CPT | Performed by: STUDENT IN AN ORGANIZED HEALTH CARE EDUCATION/TRAINING PROGRAM

## 2024-07-31 PROCEDURE — 93010 ELECTROCARDIOGRAM REPORT: CPT | Performed by: STUDENT IN AN ORGANIZED HEALTH CARE EDUCATION/TRAINING PROGRAM

## 2024-07-31 PROCEDURE — 85027 COMPLETE CBC AUTOMATED: CPT | Performed by: STUDENT IN AN ORGANIZED HEALTH CARE EDUCATION/TRAINING PROGRAM

## 2024-07-31 PROCEDURE — 2500000004 HC RX 250 GENERAL PHARMACY W/ HCPCS (ALT 636 FOR OP/ED): Performed by: PHYSICIAN ASSISTANT

## 2024-07-31 RX ADMIN — TAMSULOSIN HYDROCHLORIDE 0.4 MG: 0.4 CAPSULE ORAL at 09:27

## 2024-07-31 RX ADMIN — HEPARIN SODIUM 1100 UNITS/HR: 10000 INJECTION, SOLUTION INTRAVENOUS at 10:37

## 2024-07-31 RX ADMIN — SODIUM CHLORIDE 125 ML/HR: 9 INJECTION, SOLUTION INTRAVENOUS at 21:20

## 2024-07-31 RX ADMIN — PERFLUTREN 2 ML OF DILUTION: 6.52 INJECTION, SUSPENSION INTRAVENOUS at 04:53

## 2024-07-31 RX ADMIN — SODIUM CHLORIDE 125 ML/HR: 9 INJECTION, SOLUTION INTRAVENOUS at 14:30

## 2024-07-31 RX ADMIN — CEFTRIAXONE SODIUM 1 G: 1 INJECTION, POWDER, FOR SOLUTION INTRAMUSCULAR; INTRAVENOUS at 19:24

## 2024-07-31 ASSESSMENT — PAIN SCALES - GENERAL: PAINLEVEL_OUTOF10: 3

## 2024-07-31 ASSESSMENT — PAIN - FUNCTIONAL ASSESSMENT: PAIN_FUNCTIONAL_ASSESSMENT: 0-10

## 2024-07-31 ASSESSMENT — ACTIVITIES OF DAILY LIVING (ADL): LACK_OF_TRANSPORTATION: NO

## 2024-07-31 NOTE — CARE PLAN
The patient's goals for the shift include feel better    The clinical goals for the shift include will remain hemodynamically stable    Over the shift, the patient did make progress toward the following goals.

## 2024-07-31 NOTE — PROGRESS NOTES
Cardiology Inpatient Progress Note  [facility] [place]    ASSESSMENT AND PLAN  Mrs. Jolly Alvarado is a 53 y.o. former smoker female being consulted by the Cardiology team for DVT and pulmonary embolism. Patient with prior medical history of S/P renal surgery and contraceptive injection, recurrent nephrolithiasis, recurrent UTI, former smoker 20 years ago, on hormonal injections for menstrual cycle presents with complaints of left calf pain for 1.5 week. She states that she developed leg pain on the next day of her surgical procedure. Patient endorsed leg pain that got worsened after a recent trip to [place]. She has been having some episodes of shortness of breast last weekend. One day prior to admission, her leg pain got worse and she came to the ED today for further assessment. To be noted patient has been having outpatient kidney stone removal procedures for her recurrent UTI. She does not have any symptoms of UTI now but says she has been on antibiotics in the last 10 days. She denies chest pain, palpitations, lightheadedness, headaches, fever, chills, orthopnea, paroxysmal nocturnal dyspnea or syncope. A lower extremity duplex was done showed left lower extremity acute occlusive deep vein thrombosis in the mid and distal femoral, popliteal and posterior tibial and gastrocnemius veins. And nonocclusive DVT in proximal femoral and mid femoral peroneal veins. CTA showing bilateral extensive pulmonary embolism. Hemodynamically stable and eupneic on room air. Patient was admitted for clinical compensation.     Acute pulmonary embolism  Acute left lower extremity DVT  -DVT likely provoked, is on the Depo shot and underwent urologic surgery on July 18 which was the day before she developed left lower extremity sharp pain and swelling.  -CTA of chest shows extensive bilateral pulmonary embolism without evidence of RV strain on CT.   -Troponin negative x 1  -Room  air  -Echo pending  -Lower extremity venous duplex study shows acute occlusive DVT in the mid femoral, distal femoral, popliteal, posterior tibial and gastrocnemius veins with acute nonocclusive DVT in the proximal femoral, mid femoral and peroneal veins  -Agree with heparin infusion venous protocol, keep drip until we finalize decisions regarding possible thrombectomy.  She will transition to DOAC prior to discharge  -Discussed intervention options with attending endovascular interventional cardiologist Dr. Emanuel who review imaging and assess candidacy for left lower extremity peripheral thrombectomy which she would have to be transferred to another facility.     Subjective  Patient endorses sharp left leg pain mostly in the calf area, she denies chest pain or pressure or shortness of breath.    Objective:  Intake & Output  Net IO Since Admission: 2,100 mL [07/31/24 0926]    Today's Weight:  Vitals:    07/30/24 1825   Weight: 68.5 kg (151 lb)       PHYSICAL EXAM  Physical Exam  Vitals and nursing note reviewed.   Constitutional:       General: She is not in acute distress.  HENT:      Head: Normocephalic and atraumatic.      Mouth/Throat:      Mouth: Mucous membranes are moist.      Pharynx: Oropharynx is clear.   Eyes:      General: No scleral icterus.     Pupils: Pupils are equal, round, and reactive to light.   Cardiovascular:      Rate and Rhythm: Normal rate and regular rhythm.      Pulses: Normal pulses.      Heart sounds: Normal heart sounds, S1 normal and S2 normal. No murmur heard.     No friction rub.   Pulmonary:      Effort: Pulmonary effort is normal.      Breath sounds: Normal breath sounds.   Abdominal:      General: Bowel sounds are normal. There is no distension.      Palpations: Abdomen is soft.      Tenderness: There is no abdominal tenderness.   Musculoskeletal:         General: Normal range of motion.      Cervical back: Normal range of motion and neck supple.      Right lower leg: No edema.  "     Left lower leg: Edema present.   Skin:     General: Skin is warm and dry.      Capillary Refill: Capillary refill takes less than 2 seconds.      Findings: No rash.   Neurological:      General: No focal deficit present.      Mental Status: She is alert.   Psychiatric:         Mood and Affect: Mood normal.         Behavior: Behavior normal.        Labs:     CMP:  Recent Labs     07/31/24  0447 07/30/24  1846 07/30/24  1432   * 135* 136   K 3.9 3.9 4.0   * 109* 108*   CO2 20* 20* 24   ANIONGAP 8* 10 8*   BUN 15 16 18   CREATININE 0.95 0.92 1.01   EGFR 72 75 67   MG  --  1.94  --      Recent Labs     07/31/24  0447 07/30/24  1846 07/30/24  1432   ALBUMIN 3.2* 3.5 3.7   ALKPHOS 78 89 88   ALT 8 10 11   AST 10 11 12   BILITOT 0.3 0.4 0.4     CBC:  Recent Labs     07/31/24  0447 07/30/24  1846 07/30/24  1432   WBC 7.4 7.8 7.5   HGB 10.4* 11.8* 11.9*   HCT 33.0* 37.4 36.8    252 222   MCV 94 94 93     COAG:   Recent Labs     07/31/24  0447 07/31/24  0101 07/30/24  2102 07/30/24  1432   INR  --   --   --  1.3*   HAUF 0.6 0.7 0.9  --      ABO: No results for input(s): \"ABO\" in the last 21247 hours.  HEME/ENDO:  Recent Labs     07/30/24  1846   HGBA1C 5.1      CARDIAC:   Recent Labs     07/30/24  1719   TROPHS 9   BNP 31   No results for input(s): \"CHOL\", \"LDLF\", \"HDL\", \"TRIG\" in the last 33643 hours.    Inpatient Medications:    Current Facility-Administered Medications:     cefTRIAXone (Rocephin) 1 g in dextrose (iso) IV 50 mL, 1 g, intravenous, q24h, Cuba Mcbride MD, Stopped at 07/30/24 1951    heparin 25,000 Units in dextrose 5% 250 mL (100 Units/mL) infusion (premix), 0-4,500 Units/hr, intravenous, Continuous, Lenora Olguin PA-C, Last Rate: 11 mL/hr at 07/31/24 0544, 1,100 Units/hr at 07/31/24 0544    heparin bolus from bag 2,000-4,000 Units, 2,000-4,000 Units, intravenous, q4h PRN, Lenora Olguin PA-C    polyethylene glycol (Glycolax, Miralax) packet 17 g, 17 g, oral, Daily, Logeswari " MD Reed    sodium chloride 0.9% infusion, 125 mL/hr, intravenous, Continuous, Cuba Mcbride MD, Last Rate: 125 mL/hr at 07/31/24 0544, 125 mL/hr at 07/31/24 0544    tamsulosin (Flomax) 24 hr capsule 0.4 mg, 0.4 mg, oral, Daily, Cuba Mcbride MD     VITALS  Vitals:    07/31/24 0723   BP: 122/75   Pulse: 75   Resp: 16   Temp: 36.8 °C (98.2 °F)   SpO2: 97%       Cardiology will continue to follow.     Thank you for this interesting clinical case and allowing me to participate in the care of this patient.  Please reach me out if you have any questions or if you need any clarifications regarding the patient's care.    **Disclaimer: This note was dictated by speech recognition, and every effort has been made to prevent any error in transcription, however minor errors may be present**  _________________________________________________________  Kris Mccoy, MSN, APRN-CNP, ACNPC-AG, CCRN  Division of Cardiovascular Medicine  [facility] [place]  [facility]

## 2024-07-31 NOTE — TREATMENT PLAN
Spoke with Dr. Emanuel attending with endovascular service.  She reviewed the images and determined that the DVT in the proximal and femoral vein is nonocclusive therefore peripheral thrombectomy is not indicated.  Will continue with anticoagulation with IV heparin for another 24 hours at which point stop the drip with first dose of 10 mg Eliquis twice daily for 7 days then 5 mg twice a day for minimum 6 months.  I will order thigh-high compression stockings for left leg and elevate with 3 pillows to reduce swelling.     Kris Mccoy, MARK  Cardiology

## 2024-07-31 NOTE — PROGRESS NOTES
Jolly Wheeler is a 53 y.o. female on day 1 of admission presenting with Other acute pulmonary embolism without acute cor pulmonale (Multi).      Subjective   Patient seen and examined at bedside.  She is doing pretty well.  This been some sinus pauses and bradycardia the telemetry.  Spoke with cardiology and recommended Holter monitor discharge       Objective     Last Recorded Vitals  /69 (BP Location: Left arm, Patient Position: Lying)   Pulse 72   Temp 37 °C (98.6 °F) (Oral)   Resp 16   Wt 68.5 kg (151 lb)   SpO2 97%   Intake/Output last 3 Shifts:    Intake/Output Summary (Last 24 hours) at 7/31/2024 1247  Last data filed at 7/31/2024 1037  Gross per 24 hour   Intake 2503.86 ml   Output --   Net 2503.86 ml       Admission Weight  Weight: 67.1 kg (148 lb) (07/30/24 1334)    Daily Weight  07/30/24 : 68.5 kg (151 lb)    Image Results  Transthoracic Echo (TTE) [place] [address]  Phone [phone], Fax [phone]    TRANSTHORACIC ECHOCARDIOGRAM REPORT    Patient Name:      JOLLY WHEELER          Reading Physician:    [id number] Mj Almeida MD  Study Date:        7/31/2024             Ordering Provider:    [id number] MUÑOZ  MRN/PID:           [id number]              Fellow:  Accession#:        [id number]          Nurse:                Monica Burnham RN  Date of Birth/Age: 1970 / 53 years Sonographer:          Kacey Garcia RVT, RANCHO  Gender:            F                     Additional Staff:  Height:            165.10 cm             Admit Date:           7/30/2024  Weight:            67.13 kg              Admission Status:     Inpatient -                                                                 Routine  BSA / BMI:          1.74 m2 / 24.63 kg/m2 Department Location:  [address]  Blood Pressure: 129 /76 mmHg    Study Type:    TRANSTHORACIC ECHO (TTE) COMPLETE  Diagnosis/ICD: Other pulmonary embolism without acute cor pulmonale-I26.99  Indication:    PE,SOB  CPT Codes:     Echo Complete w Full Doppler-[id number]    Patient History:  Pertinent History: No previous echo.    Study Detail: The following Echo studies were performed: 2D, M-Mode, Doppler and                color flow. Definity used as a contrast agent for endocardial                border definition. Total contrast used for this procedure was 2 mL                via IV push. A bubble study was not performed. The patient was                awake.       PHYSICIAN INTERPRETATION:  Left Ventricle: The left ventricular systolic function is normal, with a Nash's biplane calculated ejection fraction of 59%. There are no regional wall motion abnormalities. The left ventricular cavity size is normal. Spectral Doppler shows a normal pattern of left ventricular diastolic filling.  Left Atrium: The left atrium is normal in size.  Right Ventricle: The right ventricle is normal in size. There is normal right ventricular global systolic function.  Right Atrium: The right atrium is normal in size.  Aortic Valve: The aortic valve is trileaflet. The aortic valve dimensionless index is 0.70. There is no evidence of aortic valve regurgitation. The peak instantaneous gradient of the aortic valve is 12.5 mmHg. The mean gradient of the aortic valve is 6.0 mmHg.  Mitral Valve: The mitral valve is normal in structure. There is mild mitral valve regurgitation.  Tricuspid Valve: The tricuspid valve is structurally normal. No evidence of tricuspid regurgitation.  Pulmonic Valve: The pulmonic valve is not well visualized. There is no indication of pulmonic valve regurgitation.  Pericardium: There is no pericardial effusion noted.  Aorta: The aortic root is normal.  Systemic Veins: The inferior vena  cava appears to be of normal size. There is IVC inspiratory collapse greater than 50%.       CONCLUSIONS:   1. The left ventricular systolic function is normal, with a Nash's biplane calculated ejection fraction of 59%.   2. There is normal right ventricular global systolic function.    QUANTITATIVE DATA SUMMARY:  2D MEASUREMENTS:                           Normal Ranges:  Ao Root d:     2.00 cm   (2.0-3.7cm)  LAs:           2.30 cm   (2.7-4.0cm)  IVSd:          1.06 cm   (0.6-1.1cm)  LVPWd:         0.98 cm   (0.6-1.1cm)  LVIDd:         3.42 cm   (3.9-5.9cm)  LVIDs:         2.12 cm  LV Mass Index: 59.0 g/m2  LV % FS        38.0 %    LA VOLUME:                                Normal Ranges:  LA Vol A4C:        18.1 ml    (22+/-6mL/m2)  LA Vol A2C:        37.5 ml  LA Vol BP:         28.2 ml  LA Vol Index A4C:  10.4ml/m2  LA Vol Index A2C:  21.5 ml/m2  LA Vol Index BP:   16.2 ml/m2  LA Area A4C:       10.0 cm2  LA Area A2C:       13.3 cm2  LA Major Axis A4C: 4.7 cm  LA Major Axis A2C: 4.0 cm  LA Volume Index:   21.0 ml/m2  LA Vol A4C:        17.2 ml  LA Vol A2C:        36.6 ml    M-MODE MEASUREMENTS:                   Normal Ranges:  AoV Exc: 1.70 cm (1.5-2.5cm)    AORTA MEASUREMENTS:                   Normal Ranges:  AoV Exc: 1.70 cm (1.5-2.5cm)    LV SYSTOLIC FUNCTION BY 2D PLANIMETRY (MOD):                       Normal Ranges:  EF-A4C View:    63 % (>=55%)  EF-A2C View:    57 %  EF-Biplane:     59 %  LV EF Reported: 59 %    LV DIASTOLIC FUNCTION:                          Normal Ranges:  MV Peak E:    1.04 m/s  (0.7-1.2 m/s)  MV Peak A:    1.11 m/s  (0.42-0.7 m/s)  E/A Ratio:    0.94      (1.0-2.2)  MV e'         0.099 m/s (>8.0)  MV lateral e' 0.10 m/s  MV medial e'  0.10 m/s  E/e' Ratio:   10.52     (<8.0)    MITRAL VALVE:                  Normal Ranges:  MV DT: 257 msec (150-240msec)    MITRAL INSUFFICIENCY:                       Normal Ranges:  MR Vmax: 440.00 cm/s    AORTIC VALVE:                                      Normal Ranges:  AoV Vmax:                1.77 m/s  (<=1.7m/s)  AoV Peak P.5 mmHg (<20mmHg)  AoV Mean P.0 mmHg  (1.7-11.5mmHg)  LVOT Max Dao:            1.21 m/s  (<=1.1m/s)  AoV VTI:                 37.70 cm  (18-25cm)  LVOT VTI:                26.30 cm  LVOT Diameter:           1.60 cm   (1.8-2.4cm)  AoV Area, VTI:           1.40 cm2  (2.5-5.5cm2)  AoV Area,Vmax:           1.37 cm2  (2.5-4.5cm2)  AoV Dimensionless Index: 0.70       RIGHT VENTRICLE:  RV Basal 3.04 cm  RV Mid   1.78 cm  RV Major 5.7 cm  TAPSE:   16.3 mm    PULMONIC VALVE:                          Normal Ranges:  PV Accel Time: 85 msec  (>120ms)  PV Max Dao:    1.4 m/s  (0.6-0.9m/s)  PV Max P.6 mmHg       [id number] Mj Almeida MD  Electronically signed on 2024 at 10:37:16 AM       ** Final **  ECG 12 Lead  Sinus rhythm with 1st degree AV block  Otherwise normal ECG  When compared with ECG of 2024 14:34,  No significant change was found      Physical Exam  Constitutional:       Appearance: Normal appearance.   HENT:      Head: Normocephalic and atraumatic.      Right Ear: Tympanic membrane normal.      Nose: Nose normal.      Mouth/Throat:      Mouth: Mucous membranes are moist.   Eyes:      Pupils: Pupils are equal, round, and reactive to light.   Cardiovascular:      Rate and Rhythm: Normal rate and regular rhythm.      Comments: Sinus pauses and bradycardia  Pulmonary:      Effort: Pulmonary effort is normal.   Abdominal:      Palpations: Abdomen is soft.   Musculoskeletal:      Cervical back: Normal range of motion.      Comments: Left calf pain   Neurological:      General: No focal deficit present.      Mental Status: She is alert and oriented to person, place, and time.   Psychiatric:         Mood and Affect: Mood normal.         Relevant Results               Assessment/Plan      53-year-old female with past medical history of recurrent kidney stones, recurrent UTI, with hormonal  injection with deep port preparation presents with acute bilateral extensive pulmonary embolism with acute occlusive DVT of left lower extremity.  Patient also has acute UTI     Assessment  Acute left lower extremity DVT  Acute submassive pulmonary embolism  Anemia  Acute urinary tract infection  Recurrent nephrolithiasis  Ex-smoker    Plan    Continue current ongoing care  Continue IV heparin drip  Spoke to the pharmacy regarding transition to Eliquis tonight  Echocardiogram reviewed  Patient had bradycardia and sinus pauses.  Reviewed with cardiology recommend Holter monitor discharge  Per cardiology patient has extensive lower extremity thrombus and would benefit from thrombectomy, decision still pending.  Continue IV antibiotics with ceftriaxone  Will stop IV fluids encourage oral hydration  Follow-up for pulmonary nodules as outpatient  Follow-up on pancreatic lesion as outpatient as well  DVT prophylaxis: Heparin  GI prophylaxis: Not required  Full code          Total time spent 35 minutes       Cuba Mcbride MD

## 2024-07-31 NOTE — PROGRESS NOTES
07/31/24 1230   Discharge Planning   Living Arrangements Spouse/significant other   Support Systems Spouse/significant other   Assistance Needed none   Type of Residence Private residence   Number of Stairs to Enter Residence 6   Number of Stairs Within Residence 0   Do you have animals or pets at home? Yes   Type of Animals or Pets 1dog   Who is requesting discharge planning? Patient   Home or Post Acute Services None   Expected Discharge Disposition Home   Does the patient need discharge transport arranged? No   Financial Resource Strain   How hard is it for you to pay for the very basics like food, housing, medical care, and heating? Not hard   Housing Stability   In the last 12 months, was there a time when you were not able to pay the mortgage or rent on time? N   In the past 12 months, how many times have you moved where you were living? 1   At any time in the past 12 months, were you homeless or living in a shelter (including now)? N   Transportation Needs   In the past 12 months, has lack of transportation kept you from medical appointments or from getting medications? no   In the past 12 months, has lack of transportation kept you from meetings, work, or from getting things needed for daily living? No     Care Transitions: Spoke with pt at bedside verified demographics and insurance information. Pt is from home with her  and feels safe. She is a new pt to [facility] and [facility] She is independent and uses no DME or oxygen at home and she is not diabetic. Pt plans to return home with  at discharge and denies needs at this time. [facility] 24/24. ADOD is 24hrs per IDR this morning. CT to follow. Oumou HUBBARDN/RN-TCC

## 2024-07-31 NOTE — CARE PLAN
Problem: Skin  Goal: Decreased wound size/increased tissue granulation at next dressing change  Outcome: Progressing  Goal: Participates in plan/prevention/treatment measures  Outcome: Progressing  Goal: Prevent/manage excess moisture  Outcome: Progressing  Goal: Prevent/minimize sheer/friction injuries  Outcome: Progressing  Flowsheets (Taken 7/30/2024 0171)  Prevent/minimize sheer/friction injuries: Use pull sheet  Goal: Promote/optimize nutrition  Outcome: Progressing  Goal: Promote skin healing  Outcome: Progressing   The patient's goals for the shift include feel better    The clinical goals for the shift include feel better

## 2024-07-31 NOTE — CONSULTS
Inpatient consult to Cardiology  Consult performed by: Zion Tao MD  Consult ordered by: Cuba Mcbride MD  Reason for consult: pulmonary embolism      History Of Present Illness:    Mrs. Jolly Alvarado is a 53 y.o. former smoker female being consulted by the Cardiology team for DVT and pulmonary embolism. Patient with prior medical history of S/P renal surgery and contraceptive injection, recurrent nephrolithiasis, recurrent UTI, former smoker 20 years ago, on hormonal injections for menstrual cycle presents with complaints of left calf pain for 1.5 week. She states that she developed leg pain on the next day of her surgical procedure. Patient endorsed leg pain that got worsened after a recent trip to [place]. She has been having some episodes of shortness of breast last weekend. One day prior to admission, her leg pain got worse and she came to the ED today for further assessment. To be noted patient has been having outpatient kidney stone removal procedures for her recurrent UTI. She does not have any symptoms of UTI now but says she has been on antibiotics in the last 10 days. She denies chest pain, palpitations, lightheadedness, headaches, fever, chills, orthopnea, paroxysmal nocturnal dyspnea or syncope. A lower extremity duplex was done showed left lower extremity acute occlusive deep vein thrombosis in the mid and distal femoral, popliteal and posterior tibial and gastrocnemius veins. And nonocclusive DVT in proximal femoral and mid femoral peroneal veins. CTA showing bilateral extensive pulmonary embolism. Hemodynamically stable and eupneic on room air. Patient was admitted for clinical compensation.     Last Recorded Vitals:  Vitals:    07/30/24 1436 07/30/24 1600 07/30/24 1649 07/30/24 1825   BP: 131/70 118/71 138/81 139/76   BP Location: Left arm Left arm Left arm Left arm   Patient Position: Sitting Sitting Sitting Sitting   Pulse: 78 70 77 86   Resp: 16 16 16 18   Temp:      "  Temrc:       SpO2: 96% 95% 100% 100%   Weight:    68.5 kg (151 lb)   Height:    1.651 m (5' 5\")       Last Labs:  CBC - 7/30/2024:  6:46 PM  7.8 11.8 252    37.4      CMP - 7/30/2024:  6:46 PM  10.7 6.9 11 --- 0.4   _ 3.5 10 89      PTT - No results in last year.  1.3   14.7 _     Troponin I, High Sensitivity   Date/Time Value Ref Range Status   07/30/2024 05:19 PM 9 0 - 13 ng/L Final     BNP   Date/Time Value Ref Range Status   07/30/2024 05:19 PM 31 0 - 99 pg/mL Final      Last I/O:  I/O last 3 completed shifts:  In: 500 (7.3 mL/kg) [IV Piggyback:500]  Out: - (0 mL/kg)   Weight: 68.5 kg     Past Cardiology Tests (Last 3 Years):  EKG:  ECG 12 lead 07/30/2024 (Preliminary)    Echo:  No results found for this or any previous visit from the past 1095 days.    Ejection Fractions:  No results found for: \"EF\"  Cath:  No results found for this or any previous visit from the past 1095 days.    Stress Test:  No results found for this or any previous visit from the past 1095 days.    Cardiac Imaging:  No results found for this or any previous visit from the past 1095 days.      Past Medical History:  She has no past medical history on file.    Past Surgical History:  She has no past surgical history on file.      Social History:  She reports that she has quit smoking. Her smoking use included cigarettes. She has never used smokeless tobacco. No history on file for alcohol use and drug use.    Family History:  No family history on file.     Allergies:  Levofloxacin    Inpatient Medications:  Scheduled medications   Medication Dose Route Frequency    cefTRIAXone  1 g intravenous q24h    polyethylene glycol  17 g oral Daily    [START ON 7/31/2024] tamsulosin  0.4 mg oral Daily     PRN medications   Medication    heparin     Continuous Medications   Medication Dose Last Rate    heparin  0-4,500 Units/hr 1,200 Units/hr (07/30/24 1641)    sodium chloride 0.9%  125 mL/hr 125 mL/hr (07/30/24 1921)     Outpatient " Medications:  Current Outpatient Medications   Medication Instructions    ibuprofen 400 mg, oral, Every 6 hours    tamsulosin (FLOMAX) 0.4 mg, oral, Daily       Physical Exam:  General: alert, oriented and in no acute distress  HEENT: NC/AT; EOMI; PERRLA, external ear is normal  Neck: supple; trachea midline; no masses; no JVD  Chest: clear breath sounds bilaterally; no wheezing  Cardio: regular rhythm, S1S2 normal, no murmurs  Abdomen: Soft, non-tender, non-distension, no organomegaly  Extremities: no clubbing/cyanosis/edema  Neuro: Grossly intact     Psychiatric: Normal mood and affect      Assessment/Plan     Mrs. Jolly Alvarado is a 53 y.o. former smoker female being consulted by the Cardiology team for DVT and pulmonary embolism. Patient with prior medical history of S/P renal surgery and contraceptive injection, recurrent nephrolithiasis, recurrent UTI, former smoker 20 years ago, on hormonal injections for menstrual cycle presents with complaints of left calf pain for 1.5 week. She states that she developed leg pain on the next day of her surgical procedure. Patient endorsed leg pain that got worsened after a recent trip to [place]. She has been having some episodes of shortness of breast last weekend. One day prior to admission, her leg pain got worse and she came to the ED today for further assessment. To be noted patient has been having outpatient kidney stone removal procedures for her recurrent UTI. She does not have any symptoms of UTI now but says she has been on antibiotics in the last 10 days. She denies chest pain, palpitations, lightheadedness, headaches, fever, chills, orthopnea, paroxysmal nocturnal dyspnea or syncope. A lower extremity duplex was done showed left lower extremity acute occlusive deep vein thrombosis in the mid and distal femoral, popliteal and posterior tibial and gastrocnemius veins. And nonocclusive DVT in proximal femoral and mid femoral peroneal veins. CTA showing bilateral  extensive pulmonary embolism. Hemodynamically stable and eupneic on room air. Patient was admitted for clinical compensation.    Assessment    # DVT / Pulmonary embolism  - Patient is eupneic, hemodynamically stable on room air.  - CTA showing pulmonary embolism.  - Would suggest to keep anticoagulation - patient is heparinized IV drip.  - Would suggest echocardiogram.  - Before patient is discharged home, would suggest to switch anticoagulation to Eliquis 10mg BID.  - Follow up in Cardiology clinic.    Thank you for allowing me to participate in the care of this patient. Please reach me out if you have any questions or if you need any clarifications regarding the patient's care.       Peripheral IV 07/30/24 20 G Right Antecubital (Active)   Site Assessment Clean;Dry;Intact 07/30/24 1433   Dressing Type Securing device;Transparent;Tape 07/30/24 1433   Line Status Blood return noted;Flushed 07/30/24 1433   Dressing Status Clean;Dry;Occlusive 07/30/24 1433   Number of days: 0       Code Status:  Full Code    Zion Tao MD  Cardiology

## 2024-08-01 LAB
ANION GAP SERPL CALC-SCNC: 8 MMOL/L (ref 10–20)
ATRIAL RATE: 63 BPM
BUN SERPL-MCNC: 9 MG/DL (ref 6–23)
CALCIUM SERPL-MCNC: 10.6 MG/DL (ref 8.6–10.3)
CHLORIDE SERPL-SCNC: 115 MMOL/L (ref 98–107)
CO2 SERPL-SCNC: 19 MMOL/L (ref 21–32)
CREAT SERPL-MCNC: 0.91 MG/DL (ref 0.5–1.05)
EGFRCR SERPLBLD CKD-EPI 2021: 76 ML/MIN/1.73M*2
ERYTHROCYTE [DISTWIDTH] IN BLOOD BY AUTOMATED COUNT: 12.8 % (ref 11.5–14.5)
GLUCOSE SERPL-MCNC: 95 MG/DL (ref 74–99)
HCT VFR BLD AUTO: 33.4 % (ref 36–46)
HGB BLD-MCNC: 10.5 G/DL (ref 12–16)
MCH RBC QN AUTO: 29.8 PG (ref 26–34)
MCHC RBC AUTO-ENTMCNC: 31.4 G/DL (ref 32–36)
MCV RBC AUTO: 95 FL (ref 80–100)
NRBC BLD-RTO: 0 /100 WBCS (ref 0–0)
P AXIS: 67 DEGREES
P OFFSET: 145 MS
P ONSET: 102 MS
PLATELET # BLD AUTO: 214 X10*3/UL (ref 150–450)
POTASSIUM SERPL-SCNC: 4 MMOL/L (ref 3.5–5.3)
PR INTERVAL: 238 MS
Q ONSET: 221 MS
QRS COUNT: 11 BEATS
QRS DURATION: 82 MS
QT INTERVAL: 396 MS
QTC CALCULATION(BAZETT): 405 MS
QTC FREDERICIA: 402 MS
R AXIS: 57 DEGREES
RBC # BLD AUTO: 3.52 X10*6/UL (ref 4–5.2)
SODIUM SERPL-SCNC: 138 MMOL/L (ref 136–145)
T AXIS: 58 DEGREES
T OFFSET: 419 MS
UFH PPP CHRO-ACNC: 0.5 IU/ML
VENTRICULAR RATE: 63 BPM
WBC # BLD AUTO: 6.2 X10*3/UL (ref 4.4–11.3)

## 2024-08-01 PROCEDURE — 85520 HEPARIN ASSAY: CPT | Performed by: STUDENT IN AN ORGANIZED HEALTH CARE EDUCATION/TRAINING PROGRAM

## 2024-08-01 PROCEDURE — 2500000004 HC RX 250 GENERAL PHARMACY W/ HCPCS (ALT 636 FOR OP/ED): Performed by: STUDENT IN AN ORGANIZED HEALTH CARE EDUCATION/TRAINING PROGRAM

## 2024-08-01 PROCEDURE — 2500000002 HC RX 250 W HCPCS SELF ADMINISTERED DRUGS (ALT 637 FOR MEDICARE OP, ALT 636 FOR OP/ED): Performed by: STUDENT IN AN ORGANIZED HEALTH CARE EDUCATION/TRAINING PROGRAM

## 2024-08-01 PROCEDURE — 36415 COLL VENOUS BLD VENIPUNCTURE: CPT | Performed by: STUDENT IN AN ORGANIZED HEALTH CARE EDUCATION/TRAINING PROGRAM

## 2024-08-01 PROCEDURE — 99232 SBSQ HOSP IP/OBS MODERATE 35: CPT | Performed by: STUDENT IN AN ORGANIZED HEALTH CARE EDUCATION/TRAINING PROGRAM

## 2024-08-01 PROCEDURE — 2500000001 HC RX 250 WO HCPCS SELF ADMINISTERED DRUGS (ALT 637 FOR MEDICARE OP)

## 2024-08-01 PROCEDURE — 1200000002 HC GENERAL ROOM WITH TELEMETRY DAILY

## 2024-08-01 PROCEDURE — 80048 BASIC METABOLIC PNL TOTAL CA: CPT | Performed by: STUDENT IN AN ORGANIZED HEALTH CARE EDUCATION/TRAINING PROGRAM

## 2024-08-01 PROCEDURE — 2500000004 HC RX 250 GENERAL PHARMACY W/ HCPCS (ALT 636 FOR OP/ED)

## 2024-08-01 PROCEDURE — 85027 COMPLETE CBC AUTOMATED: CPT | Performed by: STUDENT IN AN ORGANIZED HEALTH CARE EDUCATION/TRAINING PROGRAM

## 2024-08-01 RX ADMIN — CEFTRIAXONE SODIUM 1 G: 1 INJECTION, POWDER, FOR SOLUTION INTRAMUSCULAR; INTRAVENOUS at 18:26

## 2024-08-01 RX ADMIN — TAMSULOSIN HYDROCHLORIDE 0.4 MG: 0.4 CAPSULE ORAL at 09:10

## 2024-08-01 RX ADMIN — HEPARIN SODIUM 1100 UNITS/HR: 10000 INJECTION, SOLUTION INTRAVENOUS at 07:34

## 2024-08-01 RX ADMIN — SODIUM CHLORIDE 125 ML/HR: 9 INJECTION, SOLUTION INTRAVENOUS at 12:55

## 2024-08-01 RX ADMIN — SODIUM CHLORIDE 125 ML/HR: 9 INJECTION, SOLUTION INTRAVENOUS at 05:39

## 2024-08-01 RX ADMIN — APIXABAN 10 MG: 5 TABLET, FILM COATED ORAL at 20:59

## 2024-08-01 ASSESSMENT — PAIN SCALES - GENERAL
PAINLEVEL_OUTOF10: 0 - NO PAIN
PAINLEVEL_OUTOF10: 0 - NO PAIN

## 2024-08-01 ASSESSMENT — PAIN - FUNCTIONAL ASSESSMENT: PAIN_FUNCTIONAL_ASSESSMENT: 0-10

## 2024-08-01 ASSESSMENT — ACTIVITIES OF DAILY LIVING (ADL): LACK_OF_TRANSPORTATION: NO

## 2024-08-01 NOTE — PROGRESS NOTES
Jolly Alvarado is a 53 y.o. female on day 2 of admission presenting with Other acute pulmonary embolism without acute cor pulmonale (Multi).      Subjective   Patient seen and examined at bedside.  She is doing pretty well.  This been some sinus pauses and bradycardia the telemetry.  Spoke with cardiology and recommended Holter monitor discharge       Objective     Last Recorded Vitals  /63 (BP Location: Left arm, Patient Position: Sitting)   Pulse 77   Temp 36.6 °C (97.8 °F) (Temporal)   Resp 20   Wt 68.5 kg (151 lb)   SpO2 98%   Intake/Output last 3 Shifts:    Intake/Output Summary (Last 24 hours) at 8/1/2024 1045  Last data filed at 7/31/2024 1302  Gross per 24 hour   Intake 240 ml   Output --   Net 240 ml       Admission Weight  Weight: 67.1 kg (148 lb) (07/30/24 1334)    Daily Weight  07/30/24 : 68.5 kg (151 lb)    Image Results  ECG 12 Lead  Sinus rhythm with 1st degree AV block  Otherwise normal ECG  When compared with ECG of 31-JUL-2024 09:17,  No significant change was found      Physical Exam  Constitutional:       Appearance: Normal appearance.   HENT:      Head: Normocephalic and atraumatic.      Right Ear: Tympanic membrane normal.      Nose: Nose normal.      Mouth/Throat:      Mouth: Mucous membranes are moist.   Eyes:      Pupils: Pupils are equal, round, and reactive to light.   Cardiovascular:      Rate and Rhythm: Normal rate and regular rhythm.      Comments: Sinus pauses and bradycardia  Pulmonary:      Effort: Pulmonary effort is normal.   Abdominal:      Palpations: Abdomen is soft.   Musculoskeletal:         General: Normal range of motion.      Cervical back: Normal range of motion.      Comments: Left calf pain   Neurological:      General: No focal deficit present.      Mental Status: She is alert and oriented to person, place, and time.   Psychiatric:         Mood and Affect: Mood normal.         Relevant Results               Assessment/Plan      53-year-old female with past  medical history of recurrent kidney stones, recurrent UTI, with hormonal injection with deep port preparation presents with acute bilateral extensive pulmonary embolism with acute occlusive DVT of left lower extremity.  Patient also has acute UTI     Assessment  Acute left lower extremity DVT  Acute submassive pulmonary embolism  Anemia  Acute urinary tract infection  Recurrent nephrolithiasis  Ex-smoker    Plan    Continue current ongoing care  Continue IV heparin drip until 9 pm today   Spoke to the pharmacy regarding transition to Eliquis tomorrow 9 am   Echocardiogram reviewed  Patient had bradycardia and sinus pauses.  Reviewed with cardiology recommend Holter monitor discharge x14 days   Per cardiology patient has extensive lower extremity thrombus and would benefit from thrombectomy, no need for thrombectomy needed   Continue IV antibiotics with ceftriaxone  stop with 3 doses   Will stop IV fluids encourage oral hydration  Follow-up for pulmonary nodules as outpatient  Follow-up on pancreatic lesion as outpatient as well  DVT prophylaxis: Heparin  GI prophylaxis: Not required  Full code          Total time spent 35 minutes       Cuba Mcbride MD

## 2024-08-01 NOTE — CARE PLAN
The patient's goals for the shift include feel better    The clinical goals for the shift include Patient will have decreased left leg swelling this shift.      Problem: Skin  Goal: Prevent/manage excess moisture  Outcome: Progressing     Problem: Skin  Goal: Participates in plan/prevention/treatment measures  Outcome: Progressing

## 2024-08-01 NOTE — PROGRESS NOTES
Music Therapy Note    Jolly Alvarado was referred by nursing staff.     Therapy Session  Referral Type: New referral this admission  Visit Type: New visit  Session Start Time: 1126  Session End Time: 1126  Intervention Delivery: In-person  Conflict of Service: None  Family Present for Session: None     Pre-assessment  Unable to Assess Reason: Outcomes not assessed  Mood/Affect: Calm, Appropriate         Treatment/Interventions  Music Therapy Interventions: Assessment    Post-assessment  Unable to Assess Reason: Did not provide expressive therapy intervention  Mood/Affect: Calm, Appropriate  Total Session Time (min): 0 minutes    Narrative  Assessment Detail: Pt found sitting up in bed, awake/alert, pleasant. Pt expressed doing well today and declined music therapy services.  Follow-up: No plan to follow-up at this time.    Education Documentation  No documentation found.        Expressive Therapies Note

## 2024-08-01 NOTE — CARE PLAN
Problem: Skin  Goal: Decreased wound size/increased tissue granulation at next dressing change  Outcome: Progressing  Goal: Participates in plan/prevention/treatment measures  Outcome: Progressing  Goal: Prevent/manage excess moisture  Outcome: Progressing  Goal: Prevent/minimize sheer/friction injuries  Outcome: Progressing  Goal: Promote/optimize nutrition  Outcome: Progressing  Goal: Promote skin healing  Outcome: Progressing   The patient's goals for the shift include feel better    The clinical goals for the shift include Patient will have decreased left leg swelling this shift.

## 2024-08-01 NOTE — NURSING NOTE
Morning heparin assay of 0.5. Heparin infusion continued at current rate of 11 mL/hr.   Bed low, call light in reach, no acute distress noted.

## 2024-08-01 NOTE — PROGRESS NOTES
07/31/24 1230   Discharge Planning   Living Arrangements Spouse/significant other   Support Systems Spouse/significant other   Assistance Needed none   Type of Residence Private residence   Number of Stairs to Enter Residence 6   Number of Stairs Within Residence 0   Do you have animals or pets at home? Yes   Type of Animals or Pets 1dog   Who is requesting discharge planning? Patient   Home or Post Acute Services None   Expected Discharge Disposition Home   Does the patient need discharge transport arranged? No   Financial Resource Strain   How hard is it for you to pay for the very basics like food, housing, medical care, and heating? Not hard   Housing Stability   In the last 12 months, was there a time when you were not able to pay the mortgage or rent on time? N   In the past 12 months, how many times have you moved where you were living? 1   At any time in the past 12 months, were you homeless or living in a shelter (including now)? N   Transportation Needs   In the past 12 months, has lack of transportation kept you from medical appointments or from getting medications? no   In the past 12 months, has lack of transportation kept you from meetings, work, or from getting things needed for daily living? No     Care Transitions: Plan remains unchanged at this time pt is returning home with no new needs. ADOD is 24hrs  per the IDR meeting this morning. [facility] 24/24. CT to follow. Oumou HUBBARDN/RN-TCC

## 2024-08-02 LAB
ANION GAP SERPL CALC-SCNC: 11 MMOL/L (ref 10–20)
BUN SERPL-MCNC: 9 MG/DL (ref 6–23)
CALCIUM SERPL-MCNC: 11.1 MG/DL (ref 8.6–10.3)
CHLORIDE SERPL-SCNC: 112 MMOL/L (ref 98–107)
CO2 SERPL-SCNC: 19 MMOL/L (ref 21–32)
CREAT SERPL-MCNC: 0.9 MG/DL (ref 0.5–1.05)
EGFRCR SERPLBLD CKD-EPI 2021: 77 ML/MIN/1.73M*2
ERYTHROCYTE [DISTWIDTH] IN BLOOD BY AUTOMATED COUNT: 12.5 % (ref 11.5–14.5)
GLUCOSE SERPL-MCNC: 93 MG/DL (ref 74–99)
HCT VFR BLD AUTO: 34.9 % (ref 36–46)
HGB BLD-MCNC: 11.2 G/DL (ref 12–16)
HOLD SPECIMEN: NORMAL
MCH RBC QN AUTO: 29.9 PG (ref 26–34)
MCHC RBC AUTO-ENTMCNC: 32.1 G/DL (ref 32–36)
MCV RBC AUTO: 93 FL (ref 80–100)
NRBC BLD-RTO: 0 /100 WBCS (ref 0–0)
PLATELET # BLD AUTO: 256 X10*3/UL (ref 150–450)
POTASSIUM SERPL-SCNC: 3.9 MMOL/L (ref 3.5–5.3)
RBC # BLD AUTO: 3.74 X10*6/UL (ref 4–5.2)
SODIUM SERPL-SCNC: 138 MMOL/L (ref 136–145)
WBC # BLD AUTO: 6.5 X10*3/UL (ref 4.4–11.3)

## 2024-08-02 PROCEDURE — 1200000002 HC GENERAL ROOM WITH TELEMETRY DAILY

## 2024-08-02 PROCEDURE — 2500000002 HC RX 250 W HCPCS SELF ADMINISTERED DRUGS (ALT 637 FOR MEDICARE OP, ALT 636 FOR OP/ED): Performed by: STUDENT IN AN ORGANIZED HEALTH CARE EDUCATION/TRAINING PROGRAM

## 2024-08-02 PROCEDURE — 85027 COMPLETE CBC AUTOMATED: CPT | Performed by: STUDENT IN AN ORGANIZED HEALTH CARE EDUCATION/TRAINING PROGRAM

## 2024-08-02 PROCEDURE — 2500000001 HC RX 250 WO HCPCS SELF ADMINISTERED DRUGS (ALT 637 FOR MEDICARE OP)

## 2024-08-02 PROCEDURE — 36415 COLL VENOUS BLD VENIPUNCTURE: CPT | Performed by: STUDENT IN AN ORGANIZED HEALTH CARE EDUCATION/TRAINING PROGRAM

## 2024-08-02 PROCEDURE — 99232 SBSQ HOSP IP/OBS MODERATE 35: CPT | Performed by: HOSPITALIST

## 2024-08-02 PROCEDURE — 80048 BASIC METABOLIC PNL TOTAL CA: CPT | Performed by: STUDENT IN AN ORGANIZED HEALTH CARE EDUCATION/TRAINING PROGRAM

## 2024-08-02 PROCEDURE — 2500000004 HC RX 250 GENERAL PHARMACY W/ HCPCS (ALT 636 FOR OP/ED): Performed by: HOSPITALIST

## 2024-08-02 RX ORDER — LORAZEPAM 2 MG/ML
0.5 INJECTION INTRAMUSCULAR ONCE
Status: COMPLETED | OUTPATIENT
Start: 2024-08-02 | End: 2024-08-02

## 2024-08-02 RX ADMIN — TAMSULOSIN HYDROCHLORIDE 0.4 MG: 0.4 CAPSULE ORAL at 08:30

## 2024-08-02 RX ADMIN — LORAZEPAM 0.5 MG: 2 INJECTION INTRAMUSCULAR; INTRAVENOUS at 10:42

## 2024-08-02 RX ADMIN — APIXABAN 10 MG: 5 TABLET, FILM COATED ORAL at 08:30

## 2024-08-02 RX ADMIN — APIXABAN 10 MG: 5 TABLET, FILM COATED ORAL at 20:29

## 2024-08-02 ASSESSMENT — PAIN - FUNCTIONAL ASSESSMENT
PAIN_FUNCTIONAL_ASSESSMENT: 0-10
PAIN_FUNCTIONAL_ASSESSMENT: 0-10

## 2024-08-02 ASSESSMENT — PAIN SCALES - GENERAL
PAINLEVEL_OUTOF10: 5 - MODERATE PAIN
PAINLEVEL_OUTOF10: 0 - NO PAIN
PAINLEVEL_OUTOF10: 0 - NO PAIN

## 2024-08-02 NOTE — CARE PLAN
The patient's goals for the shift include feel better    The clinical goals for the shift include Patient will have decreased pain and leg swelling  Problem: Skin  Goal: Decreased wound size/increased tissue granulation at next dressing change  Outcome: Progressing     Problem: Skin  Goal: Participates in plan/prevention/treatment measures  Outcome: Progressing     Problem: Skin  Goal: Prevent/minimize sheer/friction injuries  Outcome: Progressing

## 2024-08-02 NOTE — CARE PLAN
Problem: Skin  Goal: Decreased wound size/increased tissue granulation at next dressing change  Outcome: Progressing  Goal: Participates in plan/prevention/treatment measures  Outcome: Progressing  Goal: Prevent/manage excess moisture  Outcome: Progressing  Goal: Prevent/minimize sheer/friction injuries  Outcome: Progressing  Goal: Promote/optimize nutrition  Outcome: Progressing  Goal: Promote skin healing  Outcome: Progressing   The patient's goals for the shift include feel better    The clinical goals for the shift include Patient will have decreased pain and leg swelling

## 2024-08-02 NOTE — PROGRESS NOTES
Care Transitions: Patient reviewed in care round meeting this AM and may be ready for discharge later today. Met with patient at bedside. Discharge plan remains unchanged, Home with spouse, denies any needs or in home services. Patient voiced concern about being discharged when still having left leg/knee pain. Hospital provider Dr. Jansen notified of patient concern. Care team to follow for any needs. Kathleen Arnold RN/TCC

## 2024-08-02 NOTE — PROGRESS NOTES
"Jolly Alvarado is a 53 y.o. female on day 3 of admission presenting with Other acute pulmonary embolism without acute cor pulmonale (Multi).    Subjective   Patient complaining of pain in left leg still significant  Denies nausea vomiting or worsening chest pain or shortness of breath  Currently hemodynamically stable  Maintaining vital saturations  No bleeding from anywhere  Denies any back pain flank pain hematuria or dysuria  No syncope or palpitations       Objective     Physical Exam  General Appearance: AAO x 3,   Skin: skin color pink, warm, and dry; no suspicious rashes or lesions  Eyes : PERRL, EOM's intact  ENT: mucous membranes pink and moist  Neck: normocephalic  Respiratory: lungs clear to auscultation anteriorly; no wheezing, rhonchi, or crackles.   Heart: regular rate and rhythm  Abdomen: Nondistended, positive bowel sounds x4, soft,  nontender  Extremities: no edema   Peripheral pulses: normal x4 extremities  Neuro: alert, coherent and conversant, no focal motor deficits  Last Recorded Vitals  Blood pressure 131/68, pulse 94, temperature 36.9 °C (98.4 °F), temperature source Temporal, resp. rate 16, height 1.651 m (5' 5\"), weight 68.5 kg (151 lb), SpO2 97%.  Intake/Output last 3 Shifts:  I/O last 3 completed shifts:  In: 4955.8 (72.4 mL/kg) [P.O.:360; I.V.:4495.8 (65.6 mL/kg); IV Piggyback:100]  Out: - (0 mL/kg)   Weight: 68.5 kg     Relevant Results  Scheduled medications  apixaban, 10 mg, oral, BID   Followed by  [START ON 8/8/2024] apixaban, 5 mg, oral, BID  polyethylene glycol, 17 g, oral, Daily  tamsulosin, 0.4 mg, oral, Daily      Continuous medications     PRN medications  PRN medications: heparin      Results for orders placed or performed during the hospital encounter of 07/30/24 (from the past 24 hour(s))   CBC   Result Value Ref Range    WBC 6.5 4.4 - 11.3 x10*3/uL    nRBC 0.0 0.0 - 0.0 /100 WBCs    RBC 3.74 (L) 4.00 - 5.20 x10*6/uL    Hemoglobin 11.2 (L) 12.0 - 16.0 g/dL    Hematocrit 34.9 " (L) 36.0 - 46.0 %    MCV 93 80 - 100 fL    MCH 29.9 26.0 - 34.0 pg    MCHC 32.1 32.0 - 36.0 g/dL    RDW 12.5 11.5 - 14.5 %    Platelets 256 150 - 450 x10*3/uL   Basic Metabolic Panel   Result Value Ref Range    Glucose 93 74 - 99 mg/dL    Sodium 138 136 - 145 mmol/L    Potassium 3.9 3.5 - 5.3 mmol/L    Chloride 112 (H) 98 - 107 mmol/L    Bicarbonate 19 (L) 21 - 32 mmol/L    Anion Gap 11 10 - 20 mmol/L    Urea Nitrogen 9 6 - 23 mg/dL    Creatinine 0.90 0.50 - 1.05 mg/dL    eGFR 77 >60 mL/min/1.73m*2    Calcium 11.1 (H) 8.6 - 10.3 mg/dL   Light Blue Top   Result Value Ref Range    Extra Tube Hold for add-ons.                             Assessment/Plan   Principal Problem:    Other acute pulmonary embolism without acute cor pulmonale (Multi)      53-year-old female on hormonal injection with history of  Recurrent kidney stones  UTI  Presented with  Acute left lower extremity DVT  Acute pulmonary embolism  UTI  Recurrent nephrolithiasis  Ex-smoker  Plan  Cardiopulmonary monitoring due to recent bradycardia/sinus pauses.  Holter monitor on discharge for 14 days  S/p heparin drip switched to Eliquis  Echo grossly unremarkable  Pain control optimally  Education counseling  Supportive care  Symptomatic management  No need for thrombectomy per discussion with vascular  S/p IV antibiotics for UTI  DC fluids  Follow pulmonology outpatient for pulmonary nodules  Follow-up outpatient with oncology/gastroenterology for pancreatic lesion  DVT prophylaxis per policy  Full code  DC tomorrow if pain well-controlled                        Ileana Jansen MD

## 2024-08-02 NOTE — NURSING NOTE
Patient reports heart racing to nurse tech vitals taken bp  155/77 heartrate 94  patient on monitor. Message sent to  Lorazepam ordered and given to patient.

## 2024-08-02 NOTE — NURSING NOTE
Patient standing in room along side her bed.  reports she is feeling better to this nurse  . Denies dizziness.

## 2024-08-03 ENCOUNTER — APPOINTMENT (OUTPATIENT)
Dept: RADIOLOGY | Facility: HOSPITAL | Age: 54
DRG: 299 | End: 2024-08-03
Payer: COMMERCIAL

## 2024-08-03 PROBLEM — I26.99 OTHER ACUTE PULMONARY EMBOLISM WITHOUT ACUTE COR PULMONALE (MULTI): Status: RESOLVED | Noted: 2024-07-30 | Resolved: 2024-08-03

## 2024-08-03 LAB
ANION GAP SERPL CALC-SCNC: 10 MMOL/L (ref 10–20)
BUN SERPL-MCNC: 15 MG/DL (ref 6–23)
CA-I BLD-SCNC: 1.59 MMOL/L (ref 1.1–1.33)
CALCIUM SERPL-MCNC: 11.4 MG/DL (ref 8.6–10.3)
CHLORIDE SERPL-SCNC: 110 MMOL/L (ref 98–107)
CO2 SERPL-SCNC: 20 MMOL/L (ref 21–32)
CREAT SERPL-MCNC: 0.94 MG/DL (ref 0.5–1.05)
EGFRCR SERPLBLD CKD-EPI 2021: 73 ML/MIN/1.73M*2
ERYTHROCYTE [DISTWIDTH] IN BLOOD BY AUTOMATED COUNT: 12.4 % (ref 11.5–14.5)
GLUCOSE SERPL-MCNC: 98 MG/DL (ref 74–99)
HCT VFR BLD AUTO: 37.7 % (ref 36–46)
HGB BLD-MCNC: 12.1 G/DL (ref 12–16)
MCH RBC QN AUTO: 29.7 PG (ref 26–34)
MCHC RBC AUTO-ENTMCNC: 32.1 G/DL (ref 32–36)
MCV RBC AUTO: 92 FL (ref 80–100)
NRBC BLD-RTO: 0 /100 WBCS (ref 0–0)
PLATELET # BLD AUTO: 300 X10*3/UL (ref 150–450)
POTASSIUM SERPL-SCNC: 4.1 MMOL/L (ref 3.5–5.3)
RBC # BLD AUTO: 4.08 X10*6/UL (ref 4–5.2)
SODIUM SERPL-SCNC: 136 MMOL/L (ref 136–145)
TSH SERPL-ACNC: 3.41 MIU/L (ref 0.44–3.98)
WBC # BLD AUTO: 7.2 X10*3/UL (ref 4.4–11.3)

## 2024-08-03 PROCEDURE — 99239 HOSP IP/OBS DSCHRG MGMT >30: CPT | Performed by: HOSPITALIST

## 2024-08-03 PROCEDURE — 2500000004 HC RX 250 GENERAL PHARMACY W/ HCPCS (ALT 636 FOR OP/ED): Performed by: HOSPITALIST

## 2024-08-03 PROCEDURE — 74176 CT ABD & PELVIS W/O CONTRAST: CPT | Performed by: RADIOLOGY

## 2024-08-03 PROCEDURE — 82652 VIT D 1 25-DIHYDROXY: CPT | Performed by: HOSPITALIST

## 2024-08-03 PROCEDURE — 84443 ASSAY THYROID STIM HORMONE: CPT | Performed by: HOSPITALIST

## 2024-08-03 PROCEDURE — 82374 ASSAY BLOOD CARBON DIOXIDE: CPT | Performed by: HOSPITALIST

## 2024-08-03 PROCEDURE — 36415 COLL VENOUS BLD VENIPUNCTURE: CPT | Performed by: HOSPITALIST

## 2024-08-03 PROCEDURE — 2500000002 HC RX 250 W HCPCS SELF ADMINISTERED DRUGS (ALT 637 FOR MEDICARE OP, ALT 636 FOR OP/ED): Performed by: STUDENT IN AN ORGANIZED HEALTH CARE EDUCATION/TRAINING PROGRAM

## 2024-08-03 PROCEDURE — 2500000001 HC RX 250 WO HCPCS SELF ADMINISTERED DRUGS (ALT 637 FOR MEDICARE OP)

## 2024-08-03 PROCEDURE — 85027 COMPLETE CBC AUTOMATED: CPT | Performed by: HOSPITALIST

## 2024-08-03 PROCEDURE — 1200000002 HC GENERAL ROOM WITH TELEMETRY DAILY

## 2024-08-03 PROCEDURE — 82330 ASSAY OF CALCIUM: CPT | Performed by: HOSPITALIST

## 2024-08-03 PROCEDURE — 74176 CT ABD & PELVIS W/O CONTRAST: CPT

## 2024-08-03 PROCEDURE — 83970 ASSAY OF PARATHORMONE: CPT | Mod: SAMLAB | Performed by: HOSPITALIST

## 2024-08-03 RX ORDER — SODIUM CHLORIDE, SODIUM LACTATE, POTASSIUM CHLORIDE, CALCIUM CHLORIDE 600; 310; 30; 20 MG/100ML; MG/100ML; MG/100ML; MG/100ML
100 INJECTION, SOLUTION INTRAVENOUS CONTINUOUS
Status: DISCONTINUED | OUTPATIENT
Start: 2024-08-03 | End: 2024-08-04 | Stop reason: HOSPADM

## 2024-08-03 RX ADMIN — APIXABAN 10 MG: 5 TABLET, FILM COATED ORAL at 08:44

## 2024-08-03 RX ADMIN — SODIUM CHLORIDE, POTASSIUM CHLORIDE, SODIUM LACTATE AND CALCIUM CHLORIDE 100 ML/HR: 600; 310; 30; 20 INJECTION, SOLUTION INTRAVENOUS at 20:28

## 2024-08-03 RX ADMIN — TAMSULOSIN HYDROCHLORIDE 0.4 MG: 0.4 CAPSULE ORAL at 08:45

## 2024-08-03 RX ADMIN — APIXABAN 10 MG: 5 TABLET, FILM COATED ORAL at 20:28

## 2024-08-03 RX ADMIN — SODIUM CHLORIDE, POTASSIUM CHLORIDE, SODIUM LACTATE AND CALCIUM CHLORIDE 100 ML/HR: 600; 310; 30; 20 INJECTION, SOLUTION INTRAVENOUS at 10:20

## 2024-08-03 ASSESSMENT — PAIN - FUNCTIONAL ASSESSMENT: PAIN_FUNCTIONAL_ASSESSMENT: 0-10

## 2024-08-03 ASSESSMENT — COGNITIVE AND FUNCTIONAL STATUS - GENERAL
DAILY ACTIVITIY SCORE: 24
MOBILITY SCORE: 24

## 2024-08-03 ASSESSMENT — PAIN SCALES - GENERAL
PAINLEVEL_OUTOF10: 0 - NO PAIN
PAINLEVEL_OUTOF10: 0 - NO PAIN

## 2024-08-03 NOTE — DISCHARGE INSTRUCTIONS
"Pulmonary embolism - Discharge instructions    The Basics  Written by the doctors and editors at [facility]  What are discharge instructions? -- Discharge instructions are information about how to take care of yourself after getting medical care for a health problem.  What is a pulmonary embolism? -- A pulmonary embolism (\"PE\") is a blockage in 1 or more of the blood vessels that supply blood to the lungs. Most often, these blockages are caused by blood clots that form elsewhere and then travel to the lungs.  Why are blood clots dangerous? -- If a blood clot forms or gets stuck inside a blood vessel, it can clog the vessel and keep blood from getting where it needs to go. When that happens in the lungs, the lungs can get damaged. Having blocked arteries in the lung can also make it hard to breathe and can even lead to death. Occasionally, it can also lead to increase pressure in the lungs or \"pulmonary hypertension.\"  HOW DO I CARE FOR MYSELF AT HOME? -- Ask the doctor or nurse what you should do when you go home. Make sure that you understand exactly what you need to do to care for yourself. Ask questions if there is anything you do not understand.  You should also:  ? Take all of your medicines exactly as your doctor tells you to. Blood clots are treated with medicines called \"anticoagulants.\" These are sometimes called \"blood thinners.\"  ? Follow your doctor's instructions about diet and your medicines. Depending on which medicines you take, you might need to pay special attention to what you eat.  ? Make sure that you know what other medicines are safe to take. Certain other medicines can affect the way that anticoagulants work.  ? Make sure that you take your medicine exactly as instructed. It's very important to take the right dose. Too much can cause bleeding, and too little can allow your blood to clot.  ? Try to take your medicine at the same time each day (or at the same times, if you take it twice a day). " "If you forget or miss a dose, call your doctor or pharmacist to find out what to do.  ? When you start taking the medicine, you might need to have your blood tested.  ? If you take warfarin (brand name: Jantoven), you will need regular blood tests to check how your blood is clotting. This is important to make sure that you get the correct dose of warfarin for you.  ? Get your medicines refilled before you run out.  ? Avoid things that could increase your risk of injury or bleeding. For example, avoid sports where you could get hurt, and be very careful if you need to use sharp tools.  ? Lower your risk of getting another blood clot. Your risk is higher if you miss a dose of medicine or stop taking your medicine. Other ways to lower your risk include:  ? Try to be active - Walk, garden, or do something active for 30 minutes or more on most days of the week.  ? If you spend a lot of time sitting, try to stand up and walk around at least every 1 to 2 hours. Avoid sitting with your legs crossed. If you can't get up, bend and straighten your knees and ankles and wiggle your toes.  ? When driving or traveling in a car, try to stop every 1 to 2 hours to get out and stretch your legs.  ? If you travel by plane, move your arms and legs regularly. Walk around and stretch your legs at least once every hour.  ? Wear elastic or compression stockings. This can improve blood flow in your legs.  ? If you smoke, it's very important to try to quit. Your doctor or nurse can help you if you are having trouble quitting.  In some cases, people get something called an \"inferior vena cava filter\" (\"IVC filter\"). If you had surgery to get an IVC filter, your doctor or nurse will tell you how to take care of your incision. Most people who have an IVC filter are not taking anticoagulants. But occasionally, both an anticoagulant and an IVC filter are used to treat PE.  What follow-up care do I need? -- Your doctor or nurse will tell you if you " need to make a follow-up appointment. If so, make sure that you know when and where to go. Have your blood tested when ordered.  When should I call the doctor? -- Call for emergency help right away (in the [place] and [place], call 9-1-1) if:  ? You feel short of breath or have trouble breathing.  ? You have sharp or severe chest pain when you breathe.  ? You are coughing up blood.  ? You have signs of stroke, like sudden:  ? Numbness or weakness of the face, arm, or leg, especially on 1 side of the body  ? Confusion, or trouble speaking or understanding  ? Trouble seeing in 1 or both eyes  ? Trouble walking, dizziness, or loss of balance or coordination  ? Severe headache with no known cause  Call the doctor or nurse for advice if:  ? You notice new or worsened swelling in your arm or leg.  ? Your arm or leg becomes numb or very painful to touch.  ? Your leg hurts when you walk, or your arm hurts when you move it.  ? Your arm or leg turns blue or gray.  ? You have discomfort when you take a deep breath.  ? You have any of these signs of abnormal bleeding:  ? Nausea  ? Blood in your bowel movements or dark-colored bowel movements  ? Headaches or dizziness  ? Nosebleeds or any other bleeding that does not stop  ? Dark red or brown urine  You should also tell your doctor if you:  ? Have an injury such as a fall where you hit your head  ? Bleed from your gums after brushing your teeth  ? Have heavy menstrual periods or bleeding between periods  ? Have more bruising than usual after a minor injury  ? Have diarrhea, vomit, or are unable to eat for more than 24 hours  ? Have a fever (temperature higher than 100.4°F or 38°C)  ? Cannot take your medicine for any reason  All topics are updated as new evidence becomes available and our peer review process is complete.  This topic retrieved from [employer] on: May 30, 2024.  Topic 600551 Version 1.0  Release: 32.5.3 - C32.150  © 2024 UpToDate, Inc. and/or its affiliates. All rights  reserved.  Consumer Information Use and Disclaimer  Disclaimer: This generalized information is a limited summary of diagnosis, treatment, and/or medication information. It is not meant to be comprehensive and should be used as a tool to help the user understand and/or assess potential diagnostic and treatment options. It does NOT include all information about conditions, treatments, medications, side effects, or risks that may apply to a specific patient. It is not intended to be medical advice or a substitute for the medical advice, diagnosis, or treatment of a health care provider based on the health care provider's examination and assessment of a patient's specific and unique circumstances. Patients must speak with a health care provider for complete information about their health, medical questions, and treatment options, including any risks or benefits regarding use of medications. This information does not endorse any treatments or medications as safe, effective, or approved for treating a specific patient. UpToDate, Inc. and its affiliates disclaim any warranty or liability relating to this information or the use thereof.The use of this information is governed by the Terms of Use, available at https://www.[employer] Groupuwer.com/en/know/clinical-effectiveness-terms. 2024© UpToDate, Inc. and its affiliates and/or licensors. All rights reserved.  © 2024 UpToDate, Inc. and/or its affiliates. All rights reserved.

## 2024-08-03 NOTE — DISCHARGE SUMMARY
Discharge Diagnosis  Other acute pulmonary embolism without acute cor pulmonale (Multi)    Issues Requiring Follow-Up  with vascular and hematology  Nephrology Dr. Sequeira  Discharge Meds     Your medication list        START taking these medications        Instructions Last Dose Given Next Dose Due   apixaban 5 mg tablet  Commonly known as: Eliquis  Start taking on: August 3, 2024      Take 2 tablets (10 mg) by mouth 2 times a day for 6 days, THEN 1 tablet (5 mg) 2 times a day.              CONTINUE taking these medications        Instructions Last Dose Given Next Dose Due   tamsulosin 0.4 mg 24 hr capsule  Commonly known as: Flomax                  STOP taking these medications      ibuprofen 200 mg tablet                  Where to Get Your Medications        These medications were sent to [facility] [address] [place] [address] [address] [address]      Phone: [phone]   apixaban 5 mg tablet         Test Results Pending At Discharge  Pending Labs       No current pending labs.            Hospital Course   53-year-old female on hormonal injections presented with acute left lower extremity DVT as well found to have acute pulmonary embolism started on heparin drip, remained hemodynamically stable no right ventricle strain and currently on room air.  Supportive care was also given including optimal pain control.  She feels much better now.  Denies any nausea vomiting shortness of breath or palpitations.  No syncope.  Seen by vascular and recommended outpatient follow-up and no need for thrombectomy at this time.  Patient doing well clinically.  Switched from heparin drip to Eliquis and advised to follow hematology as well.  She seems in good spirits and medically ready to go home.  Patient had bradycardia with sinus pauses and Holter monitor recommended on discharge.  To follow cardiology outpatient for further review.  Can continue home medicines as deemed appropriate.  Advised to  stop hormonal therapy and can follow OB/GYN/endocrine.  For pulmonary nodules can follow outpatient with pulmonology.  And pancreatic lesion can also be addressed outpatient with oncology.  Addendum : Follow pulmonology for pulmonary nodules outpatient and gastroenterology/oncology for pancreatic lesion but patient had hypercalcemia hydrated  her on 8/3 to improve calcium levels within reasonable range.  Also consulted nephrology.   8/4 : per nephrology, PTH not suppressed, can do further workup outpatient and nuclear med scan will be done possibly tomorrow or during weekdays to manage further.  Advised to hydrate well at home and drink plenty of fluids.  Patient feels much better now and fine with going home today.  Pain improved as well.  Educated on further follow-up as outpatient.  She understood acknowledged instructions given to her.  Evaluate for primary hyperparathyroidism can be addressed outpatient in office after diagnosis confirmation.  Patient asymptomatic now.  Pertinent Physical Exam At Time of Discharge  Physical Exam  General Appearance: AAO x 3,   Skin: skin color pink, warm, and dry; no suspicious rashes or lesions  Eyes : PERRL, EOM's intact  ENT: mucous membranes pink and moist  Neck: normocephalic  Respiratory: lungs clear to auscultation anteriorly; no wheezing, rhonchi, or crackles.   Heart: regular rate and rhythm  Abdomen: Nondistended, positive bowel sounds x4, soft,  nontender  Extremities: no edema   Peripheral pulses: normal x4 extremities  Neuro: alert, coherent and conversant, no focal motor deficits  Outpatient Follow-Up  Future Appointments   Date Time Provider Department Center   8/13/2024  3:30 PM KIKI Cagle-CNP [id number] [facility]   9/18/2024  3:30 PM Leonel Bunn MD [id number] [facility]   Time to dc > 35 mins       Ileana Jansen MD

## 2024-08-03 NOTE — CARE PLAN
The patient's goals for the shift include feel better feel better and go home    The clinical goals for the shift include patient  will have decreased pain . Pain control, labs and vs wdl

## 2024-08-04 VITALS
SYSTOLIC BLOOD PRESSURE: 133 MMHG | BODY MASS INDEX: 25.16 KG/M2 | DIASTOLIC BLOOD PRESSURE: 82 MMHG | HEART RATE: 88 BPM | HEIGHT: 65 IN | RESPIRATION RATE: 20 BRPM | OXYGEN SATURATION: 100 % | WEIGHT: 151 LBS | TEMPERATURE: 97.2 F

## 2024-08-04 LAB
ANION GAP SERPL CALC-SCNC: 10 MMOL/L (ref 10–20)
BUN SERPL-MCNC: 12 MG/DL (ref 6–23)
CALCIUM SERPL-MCNC: 11.5 MG/DL (ref 8.6–10.3)
CHLORIDE SERPL-SCNC: 109 MMOL/L (ref 98–107)
CO2 SERPL-SCNC: 21 MMOL/L (ref 21–32)
CREAT SERPL-MCNC: 0.86 MG/DL (ref 0.5–1.05)
EGFRCR SERPLBLD CKD-EPI 2021: 81 ML/MIN/1.73M*2
GLUCOSE SERPL-MCNC: 96 MG/DL (ref 74–99)
POTASSIUM SERPL-SCNC: 4 MMOL/L (ref 3.5–5.3)
PTH-INTACT SERPL-MCNC: 71.5 PG/ML (ref 18.5–88)
SODIUM SERPL-SCNC: 136 MMOL/L (ref 136–145)

## 2024-08-04 PROCEDURE — 99232 SBSQ HOSP IP/OBS MODERATE 35: CPT | Performed by: HOSPITALIST

## 2024-08-04 PROCEDURE — 36415 COLL VENOUS BLD VENIPUNCTURE: CPT | Performed by: HOSPITALIST

## 2024-08-04 PROCEDURE — 2500000002 HC RX 250 W HCPCS SELF ADMINISTERED DRUGS (ALT 637 FOR MEDICARE OP, ALT 636 FOR OP/ED): Performed by: STUDENT IN AN ORGANIZED HEALTH CARE EDUCATION/TRAINING PROGRAM

## 2024-08-04 PROCEDURE — 99222 1ST HOSP IP/OBS MODERATE 55: CPT | Performed by: INTERNAL MEDICINE

## 2024-08-04 PROCEDURE — 2500000001 HC RX 250 WO HCPCS SELF ADMINISTERED DRUGS (ALT 637 FOR MEDICARE OP)

## 2024-08-04 PROCEDURE — 2500000004 HC RX 250 GENERAL PHARMACY W/ HCPCS (ALT 636 FOR OP/ED): Performed by: HOSPITALIST

## 2024-08-04 PROCEDURE — 82374 ASSAY BLOOD CARBON DIOXIDE: CPT | Performed by: HOSPITALIST

## 2024-08-04 RX ADMIN — SODIUM CHLORIDE, POTASSIUM CHLORIDE, SODIUM LACTATE AND CALCIUM CHLORIDE 100 ML/HR: 600; 310; 30; 20 INJECTION, SOLUTION INTRAVENOUS at 05:58

## 2024-08-04 RX ADMIN — APIXABAN 10 MG: 5 TABLET, FILM COATED ORAL at 08:22

## 2024-08-04 RX ADMIN — TAMSULOSIN HYDROCHLORIDE 0.4 MG: 0.4 CAPSULE ORAL at 08:23

## 2024-08-04 ASSESSMENT — PAIN - FUNCTIONAL ASSESSMENT: PAIN_FUNCTIONAL_ASSESSMENT: 0-10

## 2024-08-04 ASSESSMENT — PAIN SCALES - GENERAL: PAINLEVEL_OUTOF10: 0 - NO PAIN

## 2024-08-04 NOTE — CONSULTS
Reason For Consult  Hypercalcemia    History Of Present Illness  Jolly Alvarado is a 53 y.o. female presenting with left leg pain.  She presented to the emergency room with left leg pain and was found to have a DVT and a PE.  Since Memorial Day she has been struggling with kidney stones.  She has had multiple procedures secondary to multiple kidney stones  This is the first time she has had kidney stones  I was asked to see her secondary to hypercalcemia and she states she has never been told she has this before  She really has no other medical problems    Past Medical History  She has no past medical history on file.    Surgical History  She has no past surgical history on file.     Social History  She reports that she has quit smoking. Her smoking use included cigarettes. She has never used smokeless tobacco. No history on file for alcohol use and drug use.    Family History  No family history on file.     Allergies  Levofloxacin    Review of Systems  A full 10 point review of systems was obtained is negative except for HPI as above     Physical Exam  Physical Exam  Constitutional:       Appearance: Normal appearance.   HENT:      Head: Normocephalic and atraumatic.      Right Ear: External ear normal.      Left Ear: External ear normal.      Nose: Nose normal.      Mouth/Throat:      Mouth: Mucous membranes are moist.      Pharynx: Oropharynx is clear.   Eyes:      Extraocular Movements: Extraocular movements intact.      Conjunctiva/sclera: Conjunctivae normal.      Pupils: Pupils are equal, round, and reactive to light.   Cardiovascular:      Rate and Rhythm: Normal rate and regular rhythm.   Pulmonary:      Effort: Pulmonary effort is normal.      Breath sounds: Normal breath sounds.   Abdominal:      General: Abdomen is flat.      Palpations: Abdomen is soft.   Skin:     General: Skin is warm and dry.   Neurological:      General: No focal deficit present.      Mental Status: She is alert and oriented to  person, place, and time.   Psychiatric:         Mood and Affect: Mood normal.         Behavior: Behavior normal.                 I&O 24HR    Intake/Output Summary (Last 24 hours) at 8/4/2024 0748  Last data filed at 8/3/2024 2000  Gross per 24 hour   Intake 420 ml   Output 525 ml   Net -105 ml       Vitals 24HR  Heart Rate:  [73-95]   Temp:  [36.3 °C (97.3 °F)-36.9 °C (98.5 °F)]   Resp:  [16-20]   BP: (114-146)/(74-86)   SpO2:  [97 %-99 %]         Relevant Results  Results reviewed     Assessment/Plan       Hypercalcemia  Elevated PTH  Recurrent nephrolithiasis  DVT with PE after surgical intervention    Plan:  At this time her PTH level is not suppressed and with her hypercalcemia likely points towards primary hyperparathyroidism  We do not have nuclear medicine available today  She can be discharged home from my standpoint and I will set up a nuclear med scan tomorrow as an outpatient for her and we will get this figured out for her as an outpatient  In the meantime she has been instructed to stay well-hydrated and drink plenty of fluids  I have also instructed her to stay away from calcium supplements at this point  I will follow closely as an outpatient  Please call with any further issues or needs    Active Problems:  There are no active Hospital Problems.        Dieudonne Sequeira, DO

## 2024-08-04 NOTE — DISCHARGE INSTR - OTHER ORDERS
Please call Dr Sequeira's office Monday morning as they will schedule the nuclear med test. After the test you will need to get your holter monitor from Respiratory.

## 2024-08-04 NOTE — PROGRESS NOTES
"Jolly Alvarado is a 53 y.o. female on day 5 of admission presenting with Other acute pulmonary embolism without acute cor pulmonale (Multi).    Subjective   Pain much improved  No nausea vomiting diarrhea constipation  No bleeding  Swelling improved  No fever chills  No joint pains or bone pains or muscle aches  No shortness of breath or chest pain at rest         Objective     Physical Exam  General Appearance: AAO x 3,   Skin: skin color pink, warm, and dry; no suspicious rashes or lesions  Eyes : PERRL, EOM's intact  ENT: mucous membranes pink and moist  Neck: normocephalic  Respiratory: lungs clear to auscultation anteriorly; no wheezing, rhonchi, or crackles.   Heart: regular rate and rhythm  Abdomen: Nondistended, positive bowel sounds x4, soft,  nontender  Extremities: no edema   Peripheral pulses: normal x4 extremities  Neuro: alert, coherent and conversant, no focal motor deficits  Last Recorded Vitals  Blood pressure 114/86, pulse 95, temperature 36.9 °C (98.4 °F), temperature source Temporal, resp. rate 20, height 1.651 m (5' 5\"), weight 68.5 kg (151 lb), SpO2 99%.  Intake/Output last 3 Shifts:  I/O last 3 completed shifts:  In: 500 (7.3 mL/kg) [P.O.:500]  Out: 525 (7.7 mL/kg) [Urine:525 (0.2 mL/kg/hr)]  Weight: 68.5 kg     Relevant Results  Scheduled medications  apixaban, 10 mg, oral, BID   Followed by  [START ON 8/8/2024] apixaban, 5 mg, oral, BID  polyethylene glycol, 17 g, oral, Daily  tamsulosin, 0.4 mg, oral, Daily      Continuous medications  lactated Ringer's, 100 mL/hr, Last Rate: 100 mL/hr (08/04/24 1052)      PRN medications  PRN medications: heparin    Results for orders placed or performed during the hospital encounter of 07/30/24 (from the past 24 hour(s))   Basic metabolic panel   Result Value Ref Range    Glucose 96 74 - 99 mg/dL    Sodium 136 136 - 145 mmol/L    Potassium 4.0 3.5 - 5.3 mmol/L    Chloride 109 (H) 98 - 107 mmol/L    Bicarbonate 21 21 - 32 mmol/L    Anion Gap 10 10 - 20 " mmol/L    Urea Nitrogen 12 6 - 23 mg/dL    Creatinine 0.86 0.50 - 1.05 mg/dL    eGFR 81 >60 mL/min/1.73m*2    Calcium 11.5 (H) 8.6 - 10.3 mg/dL                          Assessment/Plan     53-year-old female on hormonal injections  Presented with  Acute left lower extremity DVT and  Acute pulmonary embolism  Hypercalcemia  Plan  On Eliquis  Patient had episodes of bradycardia and hence Holter monitor recommended by cardiology upon discharge which is being set up  Uneventful asymptomatic and hemodynamically stable otherwise  Patient to follow OB/GYN endocrine outpatient  To stop hormonal therapy  Patient to follow gastroenterology/oncology for pancreatic lesion  To follow pulmonology outpatient for pulmonary nodules  Patient was hydrated and calcium level still being monitored, recommended by nephrology to arrange nuclear med scan likely tomorrow with possibility of primary hyperparathyroidism  Patient feels much better clinically and would like to go home  She will resume anticoagulation upon discharge and to follow hematology as well  Lab workup can be repeated as deemed appropriate by nephrology  Holter monitor for 14 days on DC and to follow cardiology  Medically stable at this time  Continue current medicines as reconciled                  Ileana Jansen MD

## 2024-08-04 NOTE — NURSING NOTE
Reviewed dc instructions with pt and her . Pt verbalized understanding of instructions, medication, and follow up appt. Pt left ambulatory with personal items to personal vehicle.

## 2024-08-05 ENCOUNTER — PATIENT OUTREACH (OUTPATIENT)
Dept: PRIMARY CARE | Facility: CLINIC | Age: 54
End: 2024-08-05
Payer: COMMERCIAL

## 2024-08-05 DIAGNOSIS — E21.3 HYPERPARATHYROIDISM (MULTI): ICD-10-CM

## 2024-08-05 DIAGNOSIS — E83.52 HYPERCALCEMIA: ICD-10-CM

## 2024-08-05 NOTE — PROGRESS NOTES
Discharge Facility:  [facility]    Discharge Diagnosis:  Other acute pulmonary embolism without acute cor pulmonale     Admission Date:  7/30/24  Discharge Date:   8/4/24    PCP Appointment Date:  8/13/24 Renate Driver CNP    Specialist Appointment Date:   TBD-hematology  TBD-Cardio  TBD-OB/GYN -pt aware that she needs to get off depo shot    8/12/24- Nephrology Dr. Sequeira    Visit Type: Cardiology Clinic Visit           Date: 9/18/2024                  Dept: [facility]                  Provider: Leonel Bunn                  Time: 3:30 PM  Hospital Encounter and Summary Linked: Yes    See discharge assessment below for further details  Medications  Medications reviewed with patient/caregiver?: No (Got disconnected at end of call when we were about to review meds- called back and left message for her to call me back if she has any questions about her meds) (8/5/2024 12:58 PM)  Is the patient having any side effects they believe may be caused by any medication additions or changes?: No (8/5/2024 12:58 PM)  Does the patient have all medications ordered at discharge?: Yes (8/5/2024 12:58 PM)  Prescription Comments: START taking:  apixaban (Eliquis)   Start taking on: August 3, 2024   STOP taking:  ibuprofen 200 mg tablet (8/5/2024 12:58 PM)  Is the patient taking all medications as directed (includes completed medication regime)?: Yes (8/5/2024 12:58 PM)    Appointments  Does the patient have a primary care provider?: Yes (8/5/2024 12:58 PM)  Care Management Interventions: Verified appointment date/time/provider (New PCP pt appt on 8/13/24) (8/5/2024 12:58 PM)  Has the patient kept scheduled appointments due by today?: Yes (8/5/2024 12:58 PM)  Care Management Interventions: Educated on importance of keeping appointment; Advised to schedule with specialist; Advised to reschedule appointment (8/5/2024 12:58 PM)    Self Management  Has home health visited the patient  within 72 hours of discharge?: Not applicable (8/5/2024 12:58 PM)  What Durable Medical Equipment (DME) was ordered?: n/a (8/5/2024 12:58 PM)    Patient Teaching  Does the patient have access to their discharge instructions?: Yes (8/5/2024 12:58 PM)  Care Management Interventions: Reviewed instructions with patient; Educated on MyChart (8/5/2024 12:58 PM)  What is the patient's perception of their health status since discharge?: Improving (8/5/2024 12:58 PM)  Is the patient/caregiver able to teach back the hierarchy of who to call/visit for symptoms/problems? PCP, Specialist, Home Health nurse, Urgent Care, ED, 911: Yes (8/5/2024 12:58 PM)  Patient/Caregiver Education Comments: Successful transition of care outreach with patient. CM introduced myself and the TCM program to Jolly Alvarado. Reviewed hospital stay and answered any questions. Patient denies any further discharge questions/needs at this time. CM gave my contact information and encouraged to call if needing assistance or has any further non-emergent questions prior to my next outreach. (8/5/2024 12:58 PM)

## 2024-08-06 LAB
1,25(OH)2D SERPL-MCNC: 73.3 PG/ML (ref 19.9–79.3)
ATRIAL RATE: 63 BPM
P AXIS: 67 DEGREES
P OFFSET: 145 MS
P ONSET: 102 MS
PR INTERVAL: 238 MS
Q ONSET: 221 MS
QRS COUNT: 11 BEATS
QRS DURATION: 82 MS
QT INTERVAL: 396 MS
QTC CALCULATION(BAZETT): 405 MS
QTC FREDERICIA: 402 MS
R AXIS: 57 DEGREES
T AXIS: 58 DEGREES
T OFFSET: 419 MS
VENTRICULAR RATE: 63 BPM

## 2024-08-07 ENCOUNTER — HOSPITAL ENCOUNTER (OUTPATIENT)
Dept: RADIOLOGY | Facility: HOSPITAL | Age: 54
Discharge: HOME | End: 2024-08-07
Payer: COMMERCIAL

## 2024-08-07 DIAGNOSIS — E83.52 HYPERCALCEMIA: ICD-10-CM

## 2024-08-07 PROCEDURE — A9500 TC99M SESTAMIBI: HCPCS | Performed by: INTERNAL MEDICINE

## 2024-08-07 PROCEDURE — 3430000001 HC RX 343 DIAGNOSTIC RADIOPHARMACEUTICALS: Performed by: INTERNAL MEDICINE

## 2024-08-07 PROCEDURE — 78072 PARATHYRD PLANAR W/SPECT&CT: CPT | Performed by: STUDENT IN AN ORGANIZED HEALTH CARE EDUCATION/TRAINING PROGRAM

## 2024-08-07 PROCEDURE — 78072 PARATHYRD PLANAR W/SPECT&CT: CPT

## 2024-08-07 RX ORDER — TETRAKIS(2-METHOXYISOBUTYLISOCYANIDE)COPPER(I) TETRAFLUOROBORATE 1 MG/ML
21.5 INJECTION, POWDER, LYOPHILIZED, FOR SOLUTION INTRAVENOUS
Status: COMPLETED | OUTPATIENT
Start: 2024-08-07 | End: 2024-08-07

## 2024-08-12 ENCOUNTER — APPOINTMENT (OUTPATIENT)
Dept: NEPHROLOGY | Facility: CLINIC | Age: 54
End: 2024-08-12
Payer: COMMERCIAL

## 2024-08-12 NOTE — DOCUMENTATION CLARIFICATION NOTE
"    PATIENT:               ILIANA WHEELER  ACCT #:                  [id number]  MRN:                       [id number]  :                       1970  ADMIT DATE:       2024 1:32 PM  DISCH DATE:        2024 4:08 PM  RESPONDING PROVIDER #:        [id number]          PROVIDER RESPONSE TEXT:    DVT and PE not a complication of the procedure    CDI QUERY TEXT:    Clarification    Instruction:    Based on your assessment of the patient and the clinical information, please provide the requested documentation by clicking on the appropriate radio button and enter any additional information if prompted.    Question: Please further clarify the diagnosis noted in the OP report as    When answering this query, please exercise your independent professional judgment. The fact that a question is being asked, does not imply that any particular answer is desired or expected.    The patient's clinical indicators include:  Clinical Information: Patient presents with left leg swelling.    Clinical Indicators: ED note documents \"She states it is sore in the calf.  She noticed this the day after she got out of the hospital after having a urologic procedure.  Gradually worsening\".  24 significant event note documents \"She was found to have a LLE femoral vein DVT. Given some SOB 3 days prior to admission (none currently) pt underwent CTPE which revealed bilateral main PA pulmonary emboli\".    Treatment: Admit- Heparin drip    Risk Factors: Recent Urological procedure, hormonal injections for menstrual cycle, and recent Trip to [place]  Options provided:  -- DVT and PE was a complication of the procedure  -- DVT and PE not a complication of the procedure  -- DVT is related to Hormone use  -- Other - I will add my own diagnosis  -- Refer to Clinical Documentation Reviewer    Query created by: Summer Moreno on 2024 7:23 AM      Electronically signed by:  AMINA MUÑOZ MD 2024 8:42 AM          "

## 2024-08-12 NOTE — PROGRESS NOTES
CHIEF COMPLAINT  Hospital follow up     HISTORY OF PRESENT ILLNESS    Hospital course:  Patient admitted to [facility] from 7/30-8/4 in the setting of acute LLE DVT of the mid femoral, distal femoral, popliteal, PT, and gastrocnemius veins. CTA showing extensive bilateral PE with no evidence of RV strain. Patient was started on heparin drip and then transitioned to DOAC therapy.     Cardiovascular hx:    DVT/PE:  -Currently on Eliquis 5mg twice daily  -Provoking factors: s/p renal surgery (with leg pain occurring 1 day after surgery), Recent prolonged travel to [place] and reported worsening leg pain, and hormonal injections for menstrual cycle with last injection approximately 1 week prior to hospital admission     Patient reports compliance with DOAC with no overt bleeding. She denies any pain to her left leg. Minimal swelling to LLE. She currently works at [employer] and is on her feet all day. She does not wear a compression stocking while at work but does apply the compression stocking when she gets home.     Cardiovascular testing:  Echo (July, 2024)-preserved biventricular function         Past Medical, Surgical, and Family History reviewed and updated in chart.     Reviewed all medications by prescribing practitioner or clinical pharmacist (such as prescriptions, OTCs, herbal therapies and supplements) and documented in the medical record.    Past Medical History  History reviewed. No pertinent past medical history.    Social History  Social History     Tobacco Use    Smoking status: Former     Types: Cigarettes     Passive exposure: Never    Smokeless tobacco: Never   Vaping Use    Vaping status: Never Used   Substance Use Topics    Alcohol use: Never    Drug use: Never       Family History     Family History   Problem Relation Name Age of Onset    No Known Problems Mother      Nephrolithiasis Father          Allergies:  Allergies   Allergen Reactions    Levofloxacin Nausea/vomiting     "    Outpatient Medications:  Current Outpatient Medications   Medication Instructions    apixaban (Eliquis) 5 mg tablet Take 2 tablets (10 mg) by mouth 2 times a day for 6 days, THEN 1 tablet (5 mg) 2 times a day.    tamsulosin (FLOMAX) 0.4 mg, oral, Daily          Labs:   CMP:  Recent Labs     08/04/24  0807 08/03/24  0414 08/02/24  0429 08/01/24 0518 07/31/24 0447 07/30/24  1846    136 138 138 135* 135*   K 4.0 4.1 3.9 4.0 3.9 3.9   * 110* 112* 115* 111* 109*   CO2 21 20* 19* 19* 20* 20*   ANIONGAP 10 10 11 8* 8* 10   BUN 12 15 9 9 15 16   CREATININE 0.86 0.94 0.90 0.91 0.95 0.92   EGFR 81 73 77 76 72 75   MG  --   --   --   --   --  1.94     Recent Labs     07/31/24 0447 07/30/24  1846 07/30/24  1432   ALBUMIN 3.2* 3.5 3.7   ALKPHOS 78 89 88   ALT 8 10 11   AST 10 11 12   BILITOT 0.3 0.4 0.4     CBC:  Recent Labs     08/03/24 0414 08/02/24 0429 08/01/24 0518 07/31/24 0447 07/30/24  1846   WBC 7.2 6.5 6.2 7.4 7.8   HGB 12.1 11.2* 10.5* 10.4* 11.8*   HCT 37.7 34.9* 33.4* 33.0* 37.4    256 214 205 252   MCV 92 93 95 94 94     COAG:   Recent Labs     08/01/24  0518 07/31/24 0447 07/31/24  0101 07/30/24  2102 07/30/24  1432   INR  --   --   --   --  1.3*   HAUF 0.5 0.6 0.7   < >  --     < > = values in this interval not displayed.     ABO: No results for input(s): \"ABO\" in the last 92334 hours.  HEME/ENDO:  Recent Labs     08/03/24  0935 07/30/24  1846   TSH 3.41  --    HGBA1C  --  5.1      CARDIAC:   Recent Labs     07/30/24  1719   TROPHS 9   BNP 31   No results for input(s): \"CHOL\", \"LDLF\", \"HDL\", \"TRIG\" in the last 69758 hours.  MICRO: No results for input(s): \"ESR\", \"CRP\", \"PROCAL\" in the last 68625 hours.  No results found for the last 90 days.    Notable Studies: imaging personally reviewed   EKG:  Encounter Date: 07/30/24   ECG 12 Lead   Result Value    Ventricular Rate 63    Atrial Rate 63    TN Interval 238    QRS Duration 82    QT Interval 396    QTC Calculation(Bazett) 405    P " Axis 67    R Axis 57    T Axis 58    QRS Count 11    Q Onset 221    P Onset 102    P Offset 145    T Offset 419    QTC Fredericia 402    Narrative    Sinus rhythm with 1st degree AV block  Otherwise normal ECG  When compared with ECG of 31-JUL-2024 09:17,  No significant change was found  Confirmed by Mj Almeida ([id number]) on 8/6/2024 9:27:17 AM     Echocardiogram: No results found for this or any previous visit from the past 1825 days.    Stress Testing: No results found for this or any previous visit from the past 1825 days.    Cardiac Catheterization: No results found for this or any previous visit from the past 1825 days.  No results found for this or any previous visit from the past 3650 days.         REVIEW OF SYSTEMS  A 10-point system review was completed and was negative except as noted in the HPI.      VITALS  Vitals:    08/14/24 1450   BP: 118/72   Pulse: 73   SpO2: 100%       PHYSICAL EXAM  General: awake, alert and oriented. No acute distress.   Skin: Skin is warm, dry and intact without rashes or lesions.  HEENT: normocephalic, atraumatic; conjunctivae are clear without exudates or hemorrhage. Sclera is non-icteric. Eyelids are normal in appearance without swelling or lesions. Hearing intact. Nares are patent bilaterally. Moist mucous membranes.   Cardiovascular: heart rate and rhythm are normal. No murmurs, gallops, or rubs are auscultated. S1 and S2 are heard and are of normal intensity. No JVD, no carotid bruits  Respiratory: bilateral lung sounds clear to auscultations without rales, rhonchi, or wheezes. No accessory muscle use or stridor  Gastrointestinal: non-distended, non-tender  Genitourinary: exam deferred  Musculoskeletal: ROM intact, no deformities  Extremities: pulses palpable bilaterally; minimal swelling to LLE  Neurological: no focal deficits; gait steady  Psychiatric: appropriate mood and affect; good judgment and insight          ASSESSMENT AND PLAN  Assessment/Plan   Diagnoses and  all orders for this visit:  Acute deep vein thrombosis (DVT) of left lower extremity, unspecified vein (Multi)  Bilateral pulmonary embolism (Multi)  -Patient's VTE most likely was provoked given her travel to [place] and hx of hormone injections. Patient has stopped all hormone injections  -Will continue the DOAC for 6 months; no overt bleeding   -Will order left lower venous duplex, CT of chest, and d-dimer in 6 months to assess for resolution of VTE. Will stratify risk at the 6 month visit       RTC: 6 months       Thank you for allowing me to participate in the care of this patient. Please reach me out if you have any questions or if you need any clarifications regarding the patient's care.    Kay Ortiz, STEPHANIE, APRN, FNP-C  Division of Cardiovascular Medicine  [facility]  [facility]

## 2024-08-13 ENCOUNTER — APPOINTMENT (OUTPATIENT)
Dept: PRIMARY CARE | Facility: CLINIC | Age: 54
End: 2024-08-13
Payer: COMMERCIAL

## 2024-08-13 VITALS
HEIGHT: 64 IN | BODY MASS INDEX: 25.1 KG/M2 | HEART RATE: 88 BPM | WEIGHT: 147 LBS | SYSTOLIC BLOOD PRESSURE: 121 MMHG | DIASTOLIC BLOOD PRESSURE: 79 MMHG

## 2024-08-13 DIAGNOSIS — K86.9 PANCREATIC LESION (HHS-HCC): Primary | ICD-10-CM

## 2024-08-13 DIAGNOSIS — R91.8 MULTIPLE LUNG NODULES ON CT: ICD-10-CM

## 2024-08-13 PROCEDURE — 99495 TRANSJ CARE MGMT MOD F2F 14D: CPT

## 2024-08-13 PROCEDURE — 99214 OFFICE O/P EST MOD 30 MIN: CPT

## 2024-08-13 PROCEDURE — 1036F TOBACCO NON-USER: CPT

## 2024-08-13 PROCEDURE — 3008F BODY MASS INDEX DOCD: CPT

## 2024-08-13 NOTE — PROGRESS NOTES
"Subjective   Patient ID: Jolly Alvarado is a 53 y.o. female who presents for Establish Care and ER Follow-up.    HPI   Here today to establish care as new patient  Also here for hospital discharge. Diagnosed with DVT and PE, denies SOB, chest pain, and minimal pain in her left leg. She was able to return to work today.     Has referral to cardiology and  Apt on 8/14. Has apt with general surgery regarding thyroidectomy as they think her hyperparathyroidism. Nuclear med parathyroid adenoma going to be seen by general surgery.     Pulmonary nodules seen on CT recommend following up with pulmonary, will make that apt today.     Pancreatic lesion found on scan, hospital  encouraged her to do a follow up with oncology as well as recommended a CT with contrast. Today I will order MRI with contrast of abdomen. Pending MRI results will send Oncology referral.     PMH reviewed. History of kidney stones and was going to cystoscopy with removal but cannot have it due to being on a blood thinner currently.     Review of Systems    Objective   /79 (Patient Position: Sitting)   Pulse 88   Ht 1.635 m (5' 4.37\")   Wt 66.7 kg (147 lb)   BMI 24.94 kg/m²     Physical Exam    Assessment/Plan       Lung nodules on CT  -Referral to pulmonology    Pancreatic lesion  -MRI ordered  -General Surgery referral was placed in hospital apt 8/26  -Gen Surg for parathyroid evaluation as well    Cardiology  -apt 8/14 for PE and DVT management     Immunizations   Flu shot   COVID    PNA --  Shingles   RSV      Mammo 2022 due   DEXA --   Colon cancer screening declined at the time  Pap 2023       "

## 2024-08-14 ENCOUNTER — APPOINTMENT (OUTPATIENT)
Dept: CARDIOLOGY | Facility: CLINIC | Age: 54
End: 2024-08-14
Payer: COMMERCIAL

## 2024-08-14 VITALS
SYSTOLIC BLOOD PRESSURE: 118 MMHG | DIASTOLIC BLOOD PRESSURE: 72 MMHG | WEIGHT: 146.4 LBS | BODY MASS INDEX: 25 KG/M2 | HEIGHT: 64 IN | HEART RATE: 73 BPM | OXYGEN SATURATION: 100 %

## 2024-08-14 DIAGNOSIS — I26.99 BILATERAL PULMONARY EMBOLISM (MULTI): ICD-10-CM

## 2024-08-14 DIAGNOSIS — I82.402 ACUTE DEEP VEIN THROMBOSIS (DVT) OF LEFT LOWER EXTREMITY, UNSPECIFIED VEIN (MULTI): Primary | ICD-10-CM

## 2024-08-14 PROCEDURE — 3008F BODY MASS INDEX DOCD: CPT

## 2024-08-14 PROCEDURE — 1036F TOBACCO NON-USER: CPT

## 2024-08-14 PROCEDURE — 99214 OFFICE O/P EST MOD 30 MIN: CPT

## 2024-08-19 ENCOUNTER — HOSPITAL ENCOUNTER (OUTPATIENT)
Dept: RADIOLOGY | Facility: HOSPITAL | Age: 54
Discharge: HOME | End: 2024-08-19
Payer: COMMERCIAL

## 2024-08-19 DIAGNOSIS — K86.9 PANCREATIC LESION (HHS-HCC): ICD-10-CM

## 2024-08-19 PROCEDURE — 74183 MRI ABD W/O CNTR FLWD CNTR: CPT

## 2024-08-19 PROCEDURE — 2550000001 HC RX 255 CONTRASTS

## 2024-08-19 PROCEDURE — A9575 INJ GADOTERATE MEGLUMI 0.1ML: HCPCS

## 2024-08-19 RX ORDER — GADOTERATE MEGLUMINE 376.9 MG/ML
20 INJECTION INTRAVENOUS
Status: COMPLETED | OUTPATIENT
Start: 2024-08-19 | End: 2024-08-19

## 2024-08-26 ENCOUNTER — LAB (OUTPATIENT)
Dept: LAB | Facility: LAB | Age: 54
End: 2024-08-26
Payer: COMMERCIAL

## 2024-08-26 ENCOUNTER — APPOINTMENT (OUTPATIENT)
Dept: SURGERY | Facility: CLINIC | Age: 54
End: 2024-08-26
Payer: COMMERCIAL

## 2024-08-26 VITALS
BODY MASS INDEX: 24.66 KG/M2 | SYSTOLIC BLOOD PRESSURE: 124 MMHG | WEIGHT: 148 LBS | HEIGHT: 65 IN | HEART RATE: 81 BPM | OXYGEN SATURATION: 99 % | DIASTOLIC BLOOD PRESSURE: 75 MMHG

## 2024-08-26 DIAGNOSIS — E21.3 HYPERPARATHYROIDISM (MULTI): Primary | ICD-10-CM

## 2024-08-26 DIAGNOSIS — E21.3 HYPERPARATHYROIDISM (MULTI): ICD-10-CM

## 2024-08-26 DIAGNOSIS — E89.2 S/P PARATHYROIDECTOMY (CMS/HCC): ICD-10-CM

## 2024-08-26 LAB
25(OH)D3 SERPL-MCNC: 53 NG/ML (ref 30–100)
CALCIUM SERPL-MCNC: 12.6 MG/DL (ref 8.6–10.6)
PTH-INTACT SERPL-MCNC: 129.1 PG/ML (ref 18.5–88)

## 2024-08-26 PROCEDURE — 3008F BODY MASS INDEX DOCD: CPT | Performed by: SURGERY

## 2024-08-26 PROCEDURE — 76536 US EXAM OF HEAD AND NECK: CPT | Performed by: SURGERY

## 2024-08-26 PROCEDURE — 82306 VITAMIN D 25 HYDROXY: CPT

## 2024-08-26 PROCEDURE — 82310 ASSAY OF CALCIUM: CPT

## 2024-08-26 PROCEDURE — 36415 COLL VENOUS BLD VENIPUNCTURE: CPT

## 2024-08-26 PROCEDURE — 99205 OFFICE O/P NEW HI 60 MIN: CPT | Performed by: SURGERY

## 2024-08-26 PROCEDURE — 83970 ASSAY OF PARATHORMONE: CPT

## 2024-08-26 PROCEDURE — 1036F TOBACCO NON-USER: CPT | Performed by: SURGERY

## 2024-08-26 ASSESSMENT — ENCOUNTER SYMPTOMS
ENDOCRINE NEGATIVE: 1
PSYCHIATRIC NEGATIVE: 1
NEUROLOGICAL NEGATIVE: 1
FREQUENCY: 1
MUSCULOSKELETAL NEGATIVE: 1
CONSTITUTIONAL NEGATIVE: 1
SHORTNESS OF BREATH: 1
EYES NEGATIVE: 1
CARDIOVASCULAR NEGATIVE: 1

## 2024-08-26 NOTE — LETTER
August 26, 2024     Dieudonne Sequeira DO  [address]  [facility] [address]    Patient: Jolly Alvarado   YOB: 1970   Date of Visit: 8/26/2024       Dear Dr. Dieudonne Sequeira DO:    Thank you for referring Jolly Alvarado to me for evaluation. Below are my notes for this consultation.  If you have questions, please do not hesitate to call me. I look forward to following your patient along with you.       Sincerely,     Ellis Sanchez MD      CC: No Recipients  ______________________________________________________________________________________    Subjective   Patient ID: Jolly Alvarado is a 53 y.o. female who presents for surgical consultation for primary hyperparathyroidism  I saw Mrs. Alvarado in surgery clinic today.  She was referred by Dr. Sequeira for surgical consultation for her primary hyperparathyroidism.    HPI Earlier this year in May 2024 she experienced kidney stones for the first time.  At that time she was found to have a high serum calcium level around 10.8.  She underwent 3 different urologic procedures for her kidney stones.  As a result, she unfortunately had a complication and the patient was recently hospitalized for a lower extremity DVT.  This was unfortunately complicated by multiple bilateral pulmonary emboli.  She is now on apixaban for therapy for her pulmonary emboli.    During her hospitalization she was found to have elevated calcium levels ranging from 10.5-11.5.  Parathyroid hormone level was upper end of normal at 71.5.  This would likely be inappropriately suppressed for her level of hypercalcemia.  I only see that she has had this level checked once and that should be rechecked for her.  I also did not see a recent vitamin D 25-hydroxy level checked.  I would want to get that checked as well to be sure she does not have secondary hyperparathyroidism due to vitamin D deficiency.    Family history negative for hyperparathyroidism or other endocrinopathies.    In terms of  symptoms for her, her main issue really has only been the kidney stones.  GI-no peptic ulcer disease, no pancreatitis no constipation.  Neurocognitive-energy levels are good no depression.  Bone-no fractures, she has never had a DEXA bone density test.    She denies any neck pain no difficulty swallowing.  She obviously had shortness of breath around the time of her bilateral pulmonary emboli but she says this is improved dramatically over the last couple of weeks.  She denies any history of head neck or chest radiation exposure.      Review of Systems   Constitutional: Negative.    HENT: Negative.     Eyes: Negative.    Respiratory:  Positive for shortness of breath.    Cardiovascular: Negative.    Endocrine: Negative.    Genitourinary:  Positive for frequency.   Musculoskeletal: Negative.    Skin: Negative.    Neurological: Negative.    Psychiatric/Behavioral: Negative.         Objective   Physical Exam  Vitals reviewed.   Constitutional:       Appearance: Normal appearance.   Eyes:      Comments: No proptosis   Neck:      Vascular: No carotid bruit.      Comments: Her thyroid feels normal.  No palpable neck masses.  Trachea midline.  Cardiovascular:      Rate and Rhythm: Normal rate and regular rhythm.      Heart sounds: Normal heart sounds.   Pulmonary:      Effort: Pulmonary effort is normal. No respiratory distress.      Breath sounds: Normal breath sounds. No wheezing or rales.   Musculoskeletal:         General: Normal range of motion.      Left lower leg: Edema present.   Lymphadenopathy:      Cervical: No cervical adenopathy.   Neurological:      Mental Status: She is alert and oriented to person, place, and time.   Psychiatric:         Mood and Affect: Mood normal.         Behavior: Behavior normal.     NM CT PARATHYROID SPECT;  8/7/2024 1:09 pm      INDICATION:  Signs/Symptoms:Hypercalcemia.      COMPARISON:  None.      ACCESSION NUMBER(S):  [id number]      ORDERING CLINICIAN:  PK NUNO       TECHNIQUE:  DIVISION OF NUCLEAR MEDICINE  PARATHYROID IMAGING, planar imaging and tomography (SPECT CT)      The patient received an intravenous dose of  21.5 mCi of Tc-99m  sestamibi.  Early and delayed high resolution images of the anterior  neck and upper thorax were then acquired. SPECT CT images were also  acquired in the delayed phase.      FINDINGS:  Tc-99m sestamibi demonstrates physiological radiotracer uptake in the  salivary glands and the thyroid gland with gradual washout over time.      There is a focus with persistent radiotracer uptake in the left lower  neck seen on planar imaging corresponding to a soft tissue density  inferior to the lower lobe of left thyroid lobe adjacent to the  tracheal wall as seen on SPECT CT.    Patient ID: Jolly Alvarado is a 53 y.o. female.    General    Date/Time: 8/26/2024 2:03 PM    Performed by: Ellis Sanchez MD  Authorized by: Ellis Sanchez MD    Consent:     Consent obtained:  Verbal    Consent given by:  Patient    Risks, benefits, and alternatives were discussed: yes      Alternatives discussed:  No treatment and observation  Universal protocol:     Procedure explained and questions answered to patient or proxy's satisfaction: yes      Patient identity confirmed:  Verbally with patient  Procedure specific details:      Neck ultrasound    In the office I did a neck ultrasound with a 6-15 MHz linear ultrasound probe to look for possible parathyroid adenoma in a patient with biochemical evidence of primary hyperparathyroidism.  In addition, we wanted to exclude any nodular thyroid disease in accordance with American Association of endocrine surgical guidelines in patients with primary hyperparathyroidism.    Patient's thyroid gland is smooth and uniform.  No nodules or cysts are seen.  However on the left side she does have a small hypoechoic oval-shaped mass sitting just inferior to the left thyroid lobe which is consistent with a left inferior parathyroid  adenoma.  This would correlate with the area seen on her sestamibi scan.  Images were captured and reviewed with the patient and her .  Post-procedure details:     Procedure completion:  Tolerated      Assessment/Plan    Mrs. Alvarado has biochemical evidence of hypercalcemia.  Her parathyroid hormone level is only been checked once and it was inappropriately high at 71 for a patient with hypercalcemia up to 11.5.  Typically her PTH would be suppressed less than 20.  I will send her for repeat calcium PTH and vitamin D testing today to confirm her diagnosis but it certainly looks like she has primary hyperparathyroidism.    Her main issue was that she developed kidney stones associated with this.  Unfortunately this led to multiple urologic procedures for her stones and she had a complication of a DVT which progressed onto bilateral pulmonary emboli resulting in hospitalization.  She is now on anticoagulation and will need to remain so for at least the next 6 months.    Her sestamibi scan and ultrasound I performed in the office today are both consistent with a left inferior parathyroid adenoma.    Plan    1.  I told her I think that ultimately she will be a good candidate for minimally invasive parathyroidectomy.  We reviewed the procedure.  We will likely need to wait until February 2025 to do this when she can safely come off of her anticoagulation.  We will need approval from either her cardiologist or PCP who is prescribing this medication for her at that time to proceed forward.    2.  I will send her to lab to update calcium PTH and vitamin D testing.    3.  I had my nurse order a DEXA bone density test for her to also look for any signs of osteopenia or osteoporosis related to her disease process as she is perimenopausal.         Ellis Sanchez MD 08/26/24 1:33 PM

## 2024-08-26 NOTE — PROGRESS NOTES
Subjective   Patient ID: Jolly Alvarado is a 53 y.o. female who presents for surgical consultation for primary hyperparathyroidism  I saw Mrs. Alvarado in surgery clinic today.  She was referred by Dr. Sequeira for surgical consultation for her primary hyperparathyroidism.    HPI Earlier this year in May 2024 she experienced kidney stones for the first time.  At that time she was found to have a high serum calcium level around 10.8.  She underwent 3 different urologic procedures for her kidney stones.  As a result, she unfortunately had a complication and the patient was recently hospitalized for a lower extremity DVT.  This was unfortunately complicated by multiple bilateral pulmonary emboli.  She is now on apixaban for therapy for her pulmonary emboli.    During her hospitalization she was found to have elevated calcium levels ranging from 10.5-11.5.  Parathyroid hormone level was upper end of normal at 71.5.  This would likely be inappropriately suppressed for her level of hypercalcemia.  I only see that she has had this level checked once and that should be rechecked for her.  I also did not see a recent vitamin D 25-hydroxy level checked.  I would want to get that checked as well to be sure she does not have secondary hyperparathyroidism due to vitamin D deficiency.    Family history negative for hyperparathyroidism or other endocrinopathies.    In terms of symptoms for her, her main issue really has only been the kidney stones.  GI-no peptic ulcer disease, no pancreatitis no constipation.  Neurocognitive-energy levels are good no depression.  Bone-no fractures, she has never had a DEXA bone density test.    She denies any neck pain no difficulty swallowing.  She obviously had shortness of breath around the time of her bilateral pulmonary emboli but she says this is improved dramatically over the last couple of weeks.  She denies any history of head neck or chest radiation exposure.      Review of Systems    Constitutional: Negative.    HENT: Negative.     Eyes: Negative.    Respiratory:  Positive for shortness of breath.    Cardiovascular: Negative.    Endocrine: Negative.    Genitourinary:  Positive for frequency.   Musculoskeletal: Negative.    Skin: Negative.    Neurological: Negative.    Psychiatric/Behavioral: Negative.         Objective   Physical Exam  Vitals reviewed.   Constitutional:       Appearance: Normal appearance.   Eyes:      Comments: No proptosis   Neck:      Vascular: No carotid bruit.      Comments: Her thyroid feels normal.  No palpable neck masses.  Trachea midline.  Cardiovascular:      Rate and Rhythm: Normal rate and regular rhythm.      Heart sounds: Normal heart sounds.   Pulmonary:      Effort: Pulmonary effort is normal. No respiratory distress.      Breath sounds: Normal breath sounds. No wheezing or rales.   Musculoskeletal:         General: Normal range of motion.      Left lower leg: Edema present.   Lymphadenopathy:      Cervical: No cervical adenopathy.   Neurological:      Mental Status: She is alert and oriented to person, place, and time.   Psychiatric:         Mood and Affect: Mood normal.         Behavior: Behavior normal.     NM CT PARATHYROID SPECT;  8/7/2024 1:09 pm      INDICATION:  Signs/Symptoms:Hypercalcemia.      COMPARISON:  None.      ACCESSION NUMBER(S):  [id number]      ORDERING CLINICIAN:  PK NUNO      TECHNIQUE:  DIVISION OF NUCLEAR MEDICINE  PARATHYROID IMAGING, planar imaging and tomography (SPECT CT)      The patient received an intravenous dose of  21.5 mCi of Tc-99m  sestamibi.  Early and delayed high resolution images of the anterior  neck and upper thorax were then acquired. SPECT CT images were also  acquired in the delayed phase.      FINDINGS:  Tc-99m sestamibi demonstrates physiological radiotracer uptake in the  salivary glands and the thyroid gland with gradual washout over time.      There is a focus with persistent radiotracer uptake in the  left lower  neck seen on planar imaging corresponding to a soft tissue density  inferior to the lower lobe of left thyroid lobe adjacent to the  tracheal wall as seen on SPECT CT.    Patient ID: Jolly Alvarado is a 53 y.o. female.    General    Date/Time: 8/26/2024 2:03 PM    Performed by: Ellis Sanchez MD  Authorized by: Ellis Sanchez MD    Consent:     Consent obtained:  Verbal    Consent given by:  Patient    Risks, benefits, and alternatives were discussed: yes      Alternatives discussed:  No treatment and observation  Universal protocol:     Procedure explained and questions answered to patient or proxy's satisfaction: yes      Patient identity confirmed:  Verbally with patient  Procedure specific details:      Neck ultrasound    In the office I did a neck ultrasound with a 6-15 MHz linear ultrasound probe to look for possible parathyroid adenoma in a patient with biochemical evidence of primary hyperparathyroidism.  In addition, we wanted to exclude any nodular thyroid disease in accordance with American Association of endocrine surgical guidelines in patients with primary hyperparathyroidism.    Patient's thyroid gland is smooth and uniform.  No nodules or cysts are seen.  However on the left side she does have a small hypoechoic oval-shaped mass sitting just inferior to the left thyroid lobe which is consistent with a left inferior parathyroid adenoma.  This would correlate with the area seen on her sestamibi scan.  Images were captured and reviewed with the patient and her .  Post-procedure details:     Procedure completion:  Tolerated      Assessment/Plan    Mrs. Alvarado has biochemical evidence of hypercalcemia.  Her parathyroid hormone level is only been checked once and it was inappropriately high at 71 for a patient with hypercalcemia up to 11.5.  Typically her PTH would be suppressed less than 20.  I will send her for repeat calcium PTH and vitamin D testing today to confirm her diagnosis but  it certainly looks like she has primary hyperparathyroidism.    Her main issue was that she developed kidney stones associated with this.  Unfortunately this led to multiple urologic procedures for her stones and she had a complication of a DVT which progressed onto bilateral pulmonary emboli resulting in hospitalization.  She is now on anticoagulation and will need to remain so for at least the next 6 months.    Her sestamibi scan and ultrasound I performed in the office today are both consistent with a left inferior parathyroid adenoma.    Plan    1.  I told her I think that ultimately she will be a good candidate for minimally invasive parathyroidectomy.  We reviewed the procedure.  We will likely need to wait until February 2025 to do this when she can safely come off of her anticoagulation.  We will need approval from either her cardiologist or PCP who is prescribing this medication for her at that time to proceed forward.    2.  I will send her to lab to update calcium PTH and vitamin D testing.    3.  I had my nurse order a DEXA bone density test for her to also look for any signs of osteopenia or osteoporosis related to her disease process as she is perimenopausal.         Ellis Sanchez MD 08/26/24 1:33 PM

## 2024-08-27 ENCOUNTER — APPOINTMENT (OUTPATIENT)
Dept: PULMONOLOGY | Facility: CLINIC | Age: 54
End: 2024-08-27
Payer: COMMERCIAL

## 2024-08-27 VITALS
OXYGEN SATURATION: 99 % | HEART RATE: 66 BPM | WEIGHT: 150 LBS | BODY MASS INDEX: 24.99 KG/M2 | SYSTOLIC BLOOD PRESSURE: 123 MMHG | HEIGHT: 65 IN | TEMPERATURE: 97.8 F | DIASTOLIC BLOOD PRESSURE: 84 MMHG

## 2024-08-27 DIAGNOSIS — E21.0 PRIMARY HYPERPARATHYROIDISM (MULTI): Primary | ICD-10-CM

## 2024-08-27 DIAGNOSIS — E83.52 HYPERCALCEMIA: ICD-10-CM

## 2024-08-27 DIAGNOSIS — R91.8 MULTIPLE LUNG NODULES ON CT: ICD-10-CM

## 2024-08-27 PROCEDURE — 99204 OFFICE O/P NEW MOD 45 MIN: CPT | Performed by: INTERNAL MEDICINE

## 2024-08-27 PROCEDURE — 1036F TOBACCO NON-USER: CPT | Performed by: INTERNAL MEDICINE

## 2024-08-27 PROCEDURE — 3008F BODY MASS INDEX DOCD: CPT | Performed by: INTERNAL MEDICINE

## 2024-08-27 RX ORDER — CINACALCET 30 MG/1
30 TABLET, FILM COATED ORAL 2 TIMES DAILY
Qty: 60 TABLET | Refills: 1 | Status: SHIPPED | OUTPATIENT
Start: 2024-08-27 | End: 2024-08-28 | Stop reason: SDUPTHER

## 2024-08-27 NOTE — PROGRESS NOTES
Subjective   Patient ID: Jolly Alvarado is a 53 y.o. female who presents for Lung Nodule.  HPI  Patient was seen today in the office in regards to some very small nodules noted on the CT scan of her chest from 7/30/2024.  These were measured at 3 mm and 4 mm in size.  There were no larger nodules seen on the CT scan.  Review of Systems  No additional changes noted on the review of systems.  Objective   Physical Exam  Physical exam was not done today.  Assessment/Plan        Impressions:  1.  Lung nodules that measure less than 3 to 4 mm in size.  The Fleischner guidelines are based on nodules being less than 6 mm in size, considering these being low risk patients with no routine follow-up required.  Recommendations:  1.  If there are any questions in regards to this patient please feel free to contact me.  I have discussed the above findings with the patient today in the office.      This note was transcribed using the Dragon Dictation system.  There may be grammatical, punctuation, or verbiage errors that occur with voice recognition programs.    Syed Lee DO 08/27/24 4:15 PM

## 2024-08-28 DIAGNOSIS — E21.0 PRIMARY HYPERPARATHYROIDISM (MULTI): ICD-10-CM

## 2024-08-28 RX ORDER — CINACALCET 30 MG/1
30 TABLET, FILM COATED ORAL 2 TIMES DAILY
Qty: 60 TABLET | Refills: 1 | Status: SHIPPED | OUTPATIENT
Start: 2024-08-28 | End: 2024-10-27

## 2024-08-29 ENCOUNTER — PATIENT OUTREACH (OUTPATIENT)
Dept: PRIMARY CARE | Facility: CLINIC | Age: 54
End: 2024-08-29
Payer: COMMERCIAL

## 2024-08-29 NOTE — PROGRESS NOTES
Call regarding appt. with PCP on 8/31/24 & cardio 8/14/24 after hospitalization.  At time of outreach call the patient feels as if their condition has improved since last visit.  Reviewed the appointments with the pt and addressed any questions or concerns.

## 2024-08-30 ENCOUNTER — TELEPHONE (OUTPATIENT)
Dept: PRIMARY CARE | Facility: CLINIC | Age: 54
End: 2024-08-30
Payer: COMMERCIAL

## 2024-08-30 ENCOUNTER — HOSPITAL ENCOUNTER (OUTPATIENT)
Dept: RADIOLOGY | Facility: CLINIC | Age: 54
Discharge: HOME | End: 2024-08-30
Payer: COMMERCIAL

## 2024-08-30 DIAGNOSIS — E21.3 HYPERPARATHYROIDISM (MULTI): ICD-10-CM

## 2024-08-30 PROCEDURE — 77080 DXA BONE DENSITY AXIAL: CPT

## 2024-08-30 PROCEDURE — 77080 DXA BONE DENSITY AXIAL: CPT | Performed by: STUDENT IN AN ORGANIZED HEALTH CARE EDUCATION/TRAINING PROGRAM

## 2024-08-30 ASSESSMENT — LIFESTYLE VARIABLES
CURRENT_SMOKER: N
3_OR_MORE_DRINKS_PER_DAY: N

## 2024-08-30 NOTE — TELEPHONE ENCOUNTER
Pt wants to be taken off Eliquis 5mg to have a kidney surgery done. She wants to have it done before the end of the year for kidney stones. She sees dr tariq khemees (urology) at [facility] and he told her to check w her pcp about it first. She also saw Dr. Ellis Sanchez for her thyroid and he said the same thing. I set up a phone call apt w you 9/11 a few days after she sees Dr. Sequeira so that way you have time to look at the notes and talk w her about it all.

## 2024-09-06 DIAGNOSIS — I26.99 OTHER ACUTE PULMONARY EMBOLISM WITHOUT ACUTE COR PULMONALE (MULTI): ICD-10-CM

## 2024-09-09 ENCOUNTER — APPOINTMENT (OUTPATIENT)
Dept: NEPHROLOGY | Facility: CLINIC | Age: 54
End: 2024-09-09
Payer: COMMERCIAL

## 2024-09-11 ENCOUNTER — APPOINTMENT (OUTPATIENT)
Dept: PRIMARY CARE | Facility: CLINIC | Age: 54
End: 2024-09-11
Payer: COMMERCIAL

## 2024-09-11 DIAGNOSIS — E20.9: Primary | ICD-10-CM

## 2024-09-11 PROCEDURE — 99214 OFFICE O/P EST MOD 30 MIN: CPT

## 2024-09-11 PROCEDURE — 1036F TOBACCO NON-USER: CPT

## 2024-09-11 ASSESSMENT — ENCOUNTER SYMPTOMS
DIARRHEA: 0
HEADACHES: 0
LIGHT-HEADEDNESS: 0
NAUSEA: 0
VOMITING: 0
DYSURIA: 0
HEMATURIA: 0
ABDOMINAL PAIN: 0
FEVER: 0
FATIGUE: 0
COUGH: 0
CONSTIPATION: 0
SHORTNESS OF BREATH: 0
CHILLS: 0

## 2024-09-11 NOTE — PROGRESS NOTES
Subjective   Patient ID: Jolly Alvarado is a 53 y.o. female who presents for No chief complaint on file..    HPI   Telephone call today   Hyperparathyroidism is seeing Dr. Sequeira for this, she was started on Sensipar and is following up with him.   Following with Dr. Gurrola, February 2025 she will have a parathyroidectomy with the clearance of PCP and cardiology. Pt is to remain on DOAC until February per cardiology. Pt would like to complete the surgery in December/November due to insurance purposes.   Following with urology regarding kidney stones, he would like to surgically remove the stones on her right side pending DOAC plan.     Review of Systems   Constitutional:  Negative for chills, fatigue and fever.   Respiratory:  Negative for cough and shortness of breath.    Gastrointestinal:  Negative for abdominal pain, constipation, diarrhea, nausea and vomiting.   Genitourinary:  Negative for dysuria, hematuria and urgency.   Neurological:  Negative for light-headedness and headaches.       Objective   There were no vitals taken for this visit.    Physical Exam  Constitutional:       Appearance: Normal appearance.   Neurological:      Mental Status: She is alert and oriented to person, place, and time.         Assessment/Plan   Problem List Items Addressed This Visit    None       Parathyroidectomy  -Dr Gurrola to complete procedure when pt is safely able to come off Eliquis vs bridge her with Eliquis   -Pt is going to call his office to get a date for the procedure and ask if they would be okay with surgery if patient is bridged to lovenox then resumes her eliquis post-op. If so I will call cardiology and confirm this plan.     Kidney stones   -Dr. Khemees would like to surgically remove the kidney stones, cannot be done while on Eliquis   -Pt is going to call his office to get a date for the procedure and ask if they would be okay with surgery if patient is bridged to lovenox then resumes her eliquis post-op. If  so I will call cardiology and confirm this plan.     Lung nodules  -Following with Dr. Lee, due to size of nodules being <6mm she does not need to follow up unless new symptoms or concerns arise.

## 2024-09-18 ENCOUNTER — APPOINTMENT (OUTPATIENT)
Dept: CARDIOLOGY | Facility: CLINIC | Age: 54
End: 2024-09-18
Payer: COMMERCIAL

## 2024-09-19 ENCOUNTER — TELEPHONE (OUTPATIENT)
Dept: PRIMARY CARE | Facility: CLINIC | Age: 54
End: 2024-09-19
Payer: COMMERCIAL

## 2024-09-19 ENCOUNTER — PATIENT OUTREACH (OUTPATIENT)
Dept: PRIMARY CARE | Facility: CLINIC | Age: 54
End: 2024-09-19
Payer: COMMERCIAL

## 2024-09-19 NOTE — TELEPHONE ENCOUNTER
Pt has her two surgeries scheduled and wanted to discuss everything  - wants to know if she needs to schedule a telephone visit.

## 2024-09-19 NOTE — PROGRESS NOTES
Successful outreach to patient regarding hospitalization as patient continues TCM program.   At time of outreach call the patient feels as if their condition has remained the same since initial visit with PCP or specialist.  Questions or concerns addressed at this time with patient.   Provided contact information to patient if any further non-emergent needs arise.    They are working to get everything coordinated for surgeries and she has had no trouble getting a hold of providers.

## 2024-09-23 ENCOUNTER — LAB (OUTPATIENT)
Dept: LAB | Facility: LAB | Age: 54
End: 2024-09-23
Payer: COMMERCIAL

## 2024-09-23 DIAGNOSIS — E83.52 HYPERCALCEMIA: ICD-10-CM

## 2024-09-23 LAB
ANION GAP SERPL CALC-SCNC: 10 MMOL/L (ref 10–20)
BUN SERPL-MCNC: 19 MG/DL (ref 6–23)
CALCIUM SERPL-MCNC: 9.5 MG/DL (ref 8.6–10.3)
CHLORIDE SERPL-SCNC: 109 MMOL/L (ref 98–107)
CO2 SERPL-SCNC: 24 MMOL/L (ref 21–32)
CREAT SERPL-MCNC: 1.08 MG/DL (ref 0.5–1.05)
EGFRCR SERPLBLD CKD-EPI 2021: 62 ML/MIN/1.73M*2
GLUCOSE SERPL-MCNC: 112 MG/DL (ref 74–99)
POTASSIUM SERPL-SCNC: 4.2 MMOL/L (ref 3.5–5.3)
SODIUM SERPL-SCNC: 139 MMOL/L (ref 136–145)

## 2024-09-23 PROCEDURE — 80048 BASIC METABOLIC PNL TOTAL CA: CPT

## 2024-09-23 PROCEDURE — 36415 COLL VENOUS BLD VENIPUNCTURE: CPT

## 2024-09-24 ENCOUNTER — APPOINTMENT (OUTPATIENT)
Dept: PRIMARY CARE | Facility: CLINIC | Age: 54
End: 2024-09-24
Payer: COMMERCIAL

## 2024-09-24 DIAGNOSIS — I82.4Y2 ACUTE DEEP VEIN THROMBOSIS (DVT) OF PROXIMAL VEIN OF LEFT LOWER EXTREMITY (MULTI): Primary | ICD-10-CM

## 2024-09-24 PROCEDURE — 99213 OFFICE O/P EST LOW 20 MIN: CPT

## 2024-09-24 ASSESSMENT — ENCOUNTER SYMPTOMS
SHORTNESS OF BREATH: 0
CHILLS: 0
FATIGUE: 0
PALPITATIONS: 0

## 2024-09-24 NOTE — PROGRESS NOTES
Subjective   Patient ID: Jolly Alvarado is a 53 y.o. female who presents for No chief complaint on file..    HPI   Telephone call today to discuss urology surgery and that doctor wanting her to be off Eliquis for 5 days.   Review of Systems   Constitutional:  Negative for chills and fatigue.   Respiratory:  Negative for shortness of breath.    Cardiovascular:  Negative for chest pain, palpitations and leg swelling.       Objective   There were no vitals taken for this visit.    Physical Exam    Assessment/Plan   Problem List Items Addressed This Visit    None       I will call Dr Khemees office today and discuss a lovenox bridge and see if that is an option, then reach out to Cardiology for their approval as well.

## 2024-09-25 ENCOUNTER — APPOINTMENT (OUTPATIENT)
Dept: NEPHROLOGY | Facility: CLINIC | Age: 54
End: 2024-09-25
Payer: COMMERCIAL

## 2024-09-25 VITALS
HEIGHT: 65 IN | SYSTOLIC BLOOD PRESSURE: 126 MMHG | DIASTOLIC BLOOD PRESSURE: 74 MMHG | BODY MASS INDEX: 25.59 KG/M2 | HEART RATE: 72 BPM | WEIGHT: 153.6 LBS

## 2024-09-25 DIAGNOSIS — I26.99 BILATERAL PULMONARY EMBOLISM (MULTI): Primary | ICD-10-CM

## 2024-09-25 PROBLEM — E21.0 PRIMARY HYPERPARATHYROIDISM (MULTI): Status: ACTIVE | Noted: 2024-09-25

## 2024-09-25 PROCEDURE — 99213 OFFICE O/P EST LOW 20 MIN: CPT | Performed by: INTERNAL MEDICINE

## 2024-09-25 PROCEDURE — 1036F TOBACCO NON-USER: CPT | Performed by: INTERNAL MEDICINE

## 2024-09-25 PROCEDURE — 3008F BODY MASS INDEX DOCD: CPT | Performed by: INTERNAL MEDICINE

## 2024-09-25 ASSESSMENT — ENCOUNTER SYMPTOMS
EYES NEGATIVE: 1
NEUROLOGICAL NEGATIVE: 1
HEMATOLOGIC/LYMPHATIC NEGATIVE: 1
PSYCHIATRIC NEGATIVE: 1
ENDOCRINE NEGATIVE: 1
ALLERGIC/IMMUNOLOGIC NEGATIVE: 1
MUSCULOSKELETAL NEGATIVE: 1
RESPIRATORY NEGATIVE: 1
CONSTITUTIONAL NEGATIVE: 1
GASTROINTESTINAL NEGATIVE: 1
CARDIOVASCULAR NEGATIVE: 1

## 2024-09-25 NOTE — PROGRESS NOTES
Subjective   She is feeling well  No pain.  She is still on Eleiquis.    Patient ID: Jolly Alvarado is a 53 y.o. female who presents for Follow-up (Review labs).  HPI  She is here for follow-up after a recent hospitalization in which we diagnosed her with primary hyperparathyroidism  On September 23 blood work was drawn her BUN is 19 with a creatinine of 1.08 her calcium level is 9.5  Her medications include Eliquis and Sensipar.  We started her on Sensipar as she is unable to get a parathyroidectomy currently because of the new Eliquis.  She had to start Eliquis secondary to a DVT/PE during her hospitalization  Review of Systems   Constitutional: Negative.    HENT: Negative.     Eyes: Negative.    Respiratory: Negative.     Cardiovascular: Negative.    Gastrointestinal: Negative.    Endocrine: Negative.    Genitourinary: Negative.    Musculoskeletal: Negative.    Skin: Negative.    Allergic/Immunologic: Negative.    Neurological: Negative.    Hematological: Negative.    Psychiatric/Behavioral: Negative.         Objective   Physical Exam  Constitutional:       Appearance: Normal appearance.   HENT:      Head: Normocephalic and atraumatic.      Right Ear: External ear normal.      Left Ear: External ear normal.      Nose: Nose normal.      Mouth/Throat:      Mouth: Mucous membranes are moist.      Pharynx: Oropharynx is clear.   Eyes:      Extraocular Movements: Extraocular movements intact.      Conjunctiva/sclera: Conjunctivae normal.      Pupils: Pupils are equal, round, and reactive to light.   Cardiovascular:      Rate and Rhythm: Normal rate and regular rhythm.   Pulmonary:      Effort: Pulmonary effort is normal.      Breath sounds: Normal breath sounds.   Abdominal:      General: Abdomen is flat.      Palpations: Abdomen is soft.   Skin:     General: Skin is warm and dry.   Neurological:      General: No focal deficit present.      Mental Status: She is alert and oriented to person, place, and time.    Psychiatric:         Mood and Affect: Mood normal.         Behavior: Behavior normal.         Assessment/Plan   Problem List Items Addressed This Visit             ICD-10-CM    Bilateral pulmonary embolism (Multi) - Primary I26.99    Relevant Orders    Follow Up In Nephrology    Basic metabolic panel     Hypercalcemia  Primary hyperparathyroidism  Recurrent nephrolithiasis  DVT with PE after surgical intervention  CKD Underlying Maybe stage II       Dieudonne Sequeira,  09/25/24 3:00 PM

## 2024-10-08 DIAGNOSIS — I82.4Y2 ACUTE DEEP VEIN THROMBOSIS (DVT) OF PROXIMAL VEIN OF LEFT LOWER EXTREMITY (MULTI): Primary | ICD-10-CM

## 2024-10-08 DIAGNOSIS — E21.0 PRIMARY HYPERPARATHYROIDISM (MULTI): ICD-10-CM

## 2024-10-08 DIAGNOSIS — I82.4Y2 ACUTE DEEP VEIN THROMBOSIS (DVT) OF PROXIMAL VEIN OF LEFT LOWER EXTREMITY (MULTI): ICD-10-CM

## 2024-10-08 RX ORDER — CINACALCET 30 MG/1
TABLET, FILM COATED ORAL
Qty: 120 TABLET | Refills: 5 | Status: SHIPPED | OUTPATIENT
Start: 2024-10-08

## 2024-10-17 ENCOUNTER — TELEMEDICINE (OUTPATIENT)
Dept: PRIMARY CARE | Facility: CLINIC | Age: 54
End: 2024-10-17
Payer: COMMERCIAL

## 2024-10-17 DIAGNOSIS — I82.4Y2 ACUTE DEEP VEIN THROMBOSIS (DVT) OF PROXIMAL VEIN OF LEFT LOWER EXTREMITY (MULTI): Primary | ICD-10-CM

## 2024-10-17 DIAGNOSIS — E20.9: ICD-10-CM

## 2024-10-17 PROCEDURE — 99213 OFFICE O/P EST LOW 20 MIN: CPT

## 2024-10-18 ASSESSMENT — ENCOUNTER SYMPTOMS
SHORTNESS OF BREATH: 0
PALPITATIONS: 0
HEMATURIA: 0
HEADACHES: 0
COUGH: 0
FATIGUE: 0
LIGHT-HEADEDNESS: 0

## 2024-10-18 NOTE — PROGRESS NOTES
Subjective   Patient ID: Jolly Alvarado is a 53 y.o. female who presents for No chief complaint on file..    HPI   Telephone call today regarding her questions prior to surgery  She is having parathyroidectomy with Dr. Sanchez scheduled for the end of November, we did discuss she will need to bridge to Lovenox 2 days prior to the surgery. She is to call the office the week before surgery to confirm it is still scheduled, and I will order the lovenox then.     She is also scheduled to have a kidney surgery, we will attempt to reach out to that doctor as well as she will need to be off blood thinners for 2 days prior and we need to confirm this with the surgeon.     Review of Systems   Constitutional:  Negative for fatigue.   Respiratory:  Negative for cough and shortness of breath.    Cardiovascular:  Negative for chest pain, palpitations and leg swelling.   Genitourinary:  Negative for hematuria.   Neurological:  Negative for light-headedness and headaches.       Objective   There were no vitals taken for this visit.    Physical Exam    Assessment/Plan   Problem List Items Addressed This Visit    None  Parathyroidectomy  -Will bridge to lovenox prior to surgery     Need to contact dr. Suarez office regarding her blood thinners

## 2024-10-21 ENCOUNTER — LAB (OUTPATIENT)
Dept: LAB | Facility: LAB | Age: 54
End: 2024-10-21
Payer: COMMERCIAL

## 2024-10-21 DIAGNOSIS — N95.1 MENOPAUSAL AND FEMALE CLIMACTERIC STATES: Primary | ICD-10-CM

## 2024-10-21 PROCEDURE — 83002 ASSAY OF GONADOTROPIN (LH): CPT

## 2024-10-21 PROCEDURE — 83001 ASSAY OF GONADOTROPIN (FSH): CPT

## 2024-10-21 PROCEDURE — 36415 COLL VENOUS BLD VENIPUNCTURE: CPT

## 2024-10-22 LAB
FSH SERPL-ACNC: 68.3 IU/L
LH SERPL-ACNC: 35.5 IU/L

## 2024-11-01 ENCOUNTER — PATIENT OUTREACH (OUTPATIENT)
Dept: CARDIOLOGY | Facility: CLINIC | Age: 54
End: 2024-11-01
Payer: COMMERCIAL

## 2024-11-04 ENCOUNTER — PREP FOR PROCEDURE (OUTPATIENT)
Dept: SURGICAL ONCOLOGY | Facility: HOSPITAL | Age: 54
End: 2024-11-04
Payer: COMMERCIAL

## 2024-11-04 DIAGNOSIS — E21.0 PRIMARY HYPERPARATHYROIDISM (MULTI): Primary | ICD-10-CM

## 2024-11-04 NOTE — H&P (VIEW-ONLY)
Jolly Alvarado is a 53 y.o. female who was referred by Dr. Sequeira for surgical consultation for her primary hyperparathyroidism.     HPI Earlier this year in May 2024 she experienced kidney stones for the first time.  At that time she was found to have a high serum calcium level around 10.8.  She underwent 3 different urologic procedures for her kidney stones.  As a result, she unfortunately had a complication and the patient was recently hospitalized for a lower extremity DVT.  This was unfortunately complicated by multiple bilateral pulmonary emboli.  She is now on apixaban for therapy for her pulmonary emboli.     During her hospitalization she was found to have elevated calcium levels ranging from 10.5-11.5.  Parathyroid hormone level was upper end of normal at 71.5.  This would likely be inappropriately suppressed for her level of hypercalcemia.  I only see that she has had this level checked once and that should be rechecked for her.  I also did not see a recent vitamin D 25-hydroxy level checked.  I would want to get that checked as well to be sure she does not have secondary hyperparathyroidism due to vitamin D deficiency.     Family history negative for hyperparathyroidism or other endocrinopathies.     In terms of symptoms for her, her main issue really has only been the kidney stones.  GI-no peptic ulcer disease, no pancreatitis no constipation.  Neurocognitive-energy levels are good no depression.  Bone-no fractures, she has never had a DEXA bone density test.     She denies any neck pain no difficulty swallowing.  She obviously had shortness of breath around the time of her bilateral pulmonary emboli but she says this is improved dramatically over the last couple of weeks.  She denies any history of head neck or chest radiation exposure.        Review of Systems   Constitutional: Negative.    HENT: Negative.     Eyes: Negative.    Respiratory:  Positive for shortness of breath.    Cardiovascular: Negative.     Endocrine: Negative.    Genitourinary:  Positive for frequency.   Musculoskeletal: Negative.    Skin: Negative.    Neurological: Negative.    Psychiatric/Behavioral: Negative.              Objective  Physical Exam  Vitals reviewed.   Constitutional:       Appearance: Normal appearance.   Eyes:      Comments: No proptosis   Neck:      Vascular: No carotid bruit.      Comments: Her thyroid feels normal.  No palpable neck masses.  Trachea midline.  Cardiovascular:      Rate and Rhythm: Normal rate and regular rhythm.      Heart sounds: Normal heart sounds.   Pulmonary:      Effort: Pulmonary effort is normal. No respiratory distress.      Breath sounds: Normal breath sounds. No wheezing or rales.   Musculoskeletal:         General: Normal range of motion.      Left lower leg: Edema present.   Lymphadenopathy:      Cervical: No cervical adenopathy.   Neurological:      Mental Status: She is alert and oriented to person, place, and time.   Psychiatric:         Mood and Affect: Mood normal.         Behavior: Behavior normal.      NM CT PARATHYROID SPECT;  8/7/2024 1:09 pm      INDICATION:  Signs/Symptoms:Hypercalcemia.      COMPARISON:  None.      ACCESSION NUMBER(S):  [id number]      ORDERING CLINICIAN:  PK NUNO      TECHNIQUE:  DIVISION OF NUCLEAR MEDICINE  PARATHYROID IMAGING, planar imaging and tomography (SPECT CT)      The patient received an intravenous dose of  21.5 mCi of Tc-99m  sestamibi.  Early and delayed high resolution images of the anterior  neck and upper thorax were then acquired. SPECT CT images were also  acquired in the delayed phase.      FINDINGS:  Tc-99m sestamibi demonstrates physiological radiotracer uptake in the  salivary glands and the thyroid gland with gradual washout over time.      There is a focus with persistent radiotracer uptake in the left lower  neck seen on planar imaging corresponding to a soft tissue density  inferior to the lower lobe of left thyroid lobe adjacent to  the  tracheal wall as seen on SPECT CT.     Patient ID: Jolly Alvarado is a 53 y.o. female.     General     Date/Time: 8/26/2024 2:03 PM     Performed by: Ellis Sanchez MD  Authorized by: Ellis Sanchez MD    Consent:     Consent obtained:  Verbal    Consent given by:  Patient    Risks, benefits, and alternatives were discussed: yes      Alternatives discussed:  No treatment and observation  Universal protocol:     Procedure explained and questions answered to patient or proxy's satisfaction: yes      Patient identity confirmed:  Verbally with patient  Procedure specific details:      Neck ultrasound     In the office I did a neck ultrasound with a 6-15 MHz linear ultrasound probe to look for possible parathyroid adenoma in a patient with biochemical evidence of primary hyperparathyroidism.  In addition, we wanted to exclude any nodular thyroid disease in accordance with American Association of endocrine surgical guidelines in patients with primary hyperparathyroidism.     Patient's thyroid gland is smooth and uniform.  No nodules or cysts are seen.  However on the left side she does have a small hypoechoic oval-shaped mass sitting just inferior to the left thyroid lobe which is consistent with a left inferior parathyroid adenoma.  This would correlate with the area seen on her sestamibi scan.  Images were captured and reviewed with the patient and her .  Post-procedure details:     Procedure completion:  Tolerated           Assessment/Plan  Mrs. Alvarado has biochemical evidence of hypercalcemia.  Her parathyroid hormone level is only been checked once and it was inappropriately high at 71 for a patient with hypercalcemia up to 11.5.  Typically her PTH would be suppressed less than 20.  I will send her for repeat calcium PTH and vitamin D testing today to confirm her diagnosis but it certainly looks like she has primary hyperparathyroidism.     Her main issue was that she developed kidney stones associated  with this.  Unfortunately this led to multiple urologic procedures for her stones and she had a complication of a DVT which progressed onto bilateral pulmonary emboli resulting in hospitalization.  I checked in with her primary care physician who consulted with her cardiologist.  They felt that it was acceptable for her to get her surgery done in November 2024.  She would come off of her Eliquis 48 hours before surgery.  They would decide about putting her on Lovenox for bridging.    Given her history I will send her for PAT evaluation as well.    I will work to get her booked for a left sided parathyroidectomy on November 22, 2024.  Again given her history we will likely admit her for observation overnight after surgery.  She could resume her anticoagulation likely within 48 hours of surgery to decrease risk of neck hematoma.

## 2024-11-04 NOTE — H&P
Jolly Alvarado is a 53 y.o. female who was referred by Dr. Sequeira for surgical consultation for her primary hyperparathyroidism.     HPI Earlier this year in May 2024 she experienced kidney stones for the first time.  At that time she was found to have a high serum calcium level around 10.8.  She underwent 3 different urologic procedures for her kidney stones.  As a result, she unfortunately had a complication and the patient was recently hospitalized for a lower extremity DVT.  This was unfortunately complicated by multiple bilateral pulmonary emboli.  She is now on apixaban for therapy for her pulmonary emboli.     During her hospitalization she was found to have elevated calcium levels ranging from 10.5-11.5.  Parathyroid hormone level was upper end of normal at 71.5.  This would likely be inappropriately suppressed for her level of hypercalcemia.  I only see that she has had this level checked once and that should be rechecked for her.  I also did not see a recent vitamin D 25-hydroxy level checked.  I would want to get that checked as well to be sure she does not have secondary hyperparathyroidism due to vitamin D deficiency.     Family history negative for hyperparathyroidism or other endocrinopathies.     In terms of symptoms for her, her main issue really has only been the kidney stones.  GI-no peptic ulcer disease, no pancreatitis no constipation.  Neurocognitive-energy levels are good no depression.  Bone-no fractures, she has never had a DEXA bone density test.     She denies any neck pain no difficulty swallowing.  She obviously had shortness of breath around the time of her bilateral pulmonary emboli but she says this is improved dramatically over the last couple of weeks.  She denies any history of head neck or chest radiation exposure.        Review of Systems   Constitutional: Negative.    HENT: Negative.     Eyes: Negative.    Respiratory:  Positive for shortness of breath.    Cardiovascular: Negative.     Endocrine: Negative.    Genitourinary:  Positive for frequency.   Musculoskeletal: Negative.    Skin: Negative.    Neurological: Negative.    Psychiatric/Behavioral: Negative.              Objective  Physical Exam  Vitals reviewed.   Constitutional:       Appearance: Normal appearance.   Eyes:      Comments: No proptosis   Neck:      Vascular: No carotid bruit.      Comments: Her thyroid feels normal.  No palpable neck masses.  Trachea midline.  Cardiovascular:      Rate and Rhythm: Normal rate and regular rhythm.      Heart sounds: Normal heart sounds.   Pulmonary:      Effort: Pulmonary effort is normal. No respiratory distress.      Breath sounds: Normal breath sounds. No wheezing or rales.   Musculoskeletal:         General: Normal range of motion.      Left lower leg: Edema present.   Lymphadenopathy:      Cervical: No cervical adenopathy.   Neurological:      Mental Status: She is alert and oriented to person, place, and time.   Psychiatric:         Mood and Affect: Mood normal.         Behavior: Behavior normal.      NM CT PARATHYROID SPECT;  8/7/2024 1:09 pm      INDICATION:  Signs/Symptoms:Hypercalcemia.      COMPARISON:  None.      ACCESSION NUMBER(S):  [id number]      ORDERING CLINICIAN:  PK NUNO      TECHNIQUE:  DIVISION OF NUCLEAR MEDICINE  PARATHYROID IMAGING, planar imaging and tomography (SPECT CT)      The patient received an intravenous dose of  21.5 mCi of Tc-99m  sestamibi.  Early and delayed high resolution images of the anterior  neck and upper thorax were then acquired. SPECT CT images were also  acquired in the delayed phase.      FINDINGS:  Tc-99m sestamibi demonstrates physiological radiotracer uptake in the  salivary glands and the thyroid gland with gradual washout over time.      There is a focus with persistent radiotracer uptake in the left lower  neck seen on planar imaging corresponding to a soft tissue density  inferior to the lower lobe of left thyroid lobe adjacent to  the  tracheal wall as seen on SPECT CT.     Patient ID: Jolly Alvarado is a 53 y.o. female.     General     Date/Time: 8/26/2024 2:03 PM     Performed by: Ellis Sanchez MD  Authorized by: Ellis Sanchez MD    Consent:     Consent obtained:  Verbal    Consent given by:  Patient    Risks, benefits, and alternatives were discussed: yes      Alternatives discussed:  No treatment and observation  Universal protocol:     Procedure explained and questions answered to patient or proxy's satisfaction: yes      Patient identity confirmed:  Verbally with patient  Procedure specific details:      Neck ultrasound     In the office I did a neck ultrasound with a 6-15 MHz linear ultrasound probe to look for possible parathyroid adenoma in a patient with biochemical evidence of primary hyperparathyroidism.  In addition, we wanted to exclude any nodular thyroid disease in accordance with American Association of endocrine surgical guidelines in patients with primary hyperparathyroidism.     Patient's thyroid gland is smooth and uniform.  No nodules or cysts are seen.  However on the left side she does have a small hypoechoic oval-shaped mass sitting just inferior to the left thyroid lobe which is consistent with a left inferior parathyroid adenoma.  This would correlate with the area seen on her sestamibi scan.  Images were captured and reviewed with the patient and her .  Post-procedure details:     Procedure completion:  Tolerated           Assessment/Plan  Mrs. Alvarado has biochemical evidence of hypercalcemia.  Her parathyroid hormone level is only been checked once and it was inappropriately high at 71 for a patient with hypercalcemia up to 11.5.  Typically her PTH would be suppressed less than 20.  I will send her for repeat calcium PTH and vitamin D testing today to confirm her diagnosis but it certainly looks like she has primary hyperparathyroidism.     Her main issue was that she developed kidney stones associated  with this.  Unfortunately this led to multiple urologic procedures for her stones and she had a complication of a DVT which progressed onto bilateral pulmonary emboli resulting in hospitalization.  I checked in with her primary care physician who consulted with her cardiologist.  They felt that it was acceptable for her to get her surgery done in November 2024.  She would come off of her Eliquis 48 hours before surgery.  They would decide about putting her on Lovenox for bridging.    Given her history I will send her for PAT evaluation as well.    I will work to get her booked for a left sided parathyroidectomy on November 22, 2024.  Again given her history we will likely admit her for observation overnight after surgery.  She could resume her anticoagulation likely within 48 hours of surgery to decrease risk of neck hematoma.

## 2024-11-06 ENCOUNTER — TELEPHONE (OUTPATIENT)
Dept: SURGERY | Facility: CLINIC | Age: 54
End: 2024-11-06
Payer: COMMERCIAL

## 2024-11-06 NOTE — TELEPHONE ENCOUNTER
Called patient to confirm OR date 11/22/24. Notified patient she needs to attend a PAT appointment and will be contacted by the department. Patient verifies that she is on eliquis and has an appointment with Dr. Driver 11/15/24 to initiate lovenox bridge therapy and confirm instructions regarding eliquis.  Patient would like to receive pre-op instructions via [employer] and address confirmed. All questions addressed. Callback number provided.

## 2024-11-11 ENCOUNTER — TELEPHONE (OUTPATIENT)
Dept: NEPHROLOGY | Facility: CLINIC | Age: 54
End: 2024-11-11
Payer: COMMERCIAL

## 2024-11-11 DIAGNOSIS — E21.0 PRIMARY HYPERPARATHYROIDISM (MULTI): ICD-10-CM

## 2024-11-11 RX ORDER — CINACALCET 30 MG/1
30 TABLET, FILM COATED ORAL DAILY
Qty: 30 TABLET | Refills: 0 | Status: SHIPPED | OUTPATIENT
Start: 2024-11-11 | End: 2024-11-23 | Stop reason: HOSPADM

## 2024-11-11 NOTE — TELEPHONE ENCOUNTER
Pt called asking if she is to continue taking Cincalcet. If so, she needs a refill on it sent to [facility]. Also she is scheduled for Parathyroidectomy on 11/22.

## 2024-11-14 ENCOUNTER — PRE-ADMISSION TESTING (OUTPATIENT)
Dept: PREADMISSION TESTING | Facility: HOSPITAL | Age: 54
End: 2024-11-14
Payer: COMMERCIAL

## 2024-11-14 VITALS
OXYGEN SATURATION: 98 % | DIASTOLIC BLOOD PRESSURE: 82 MMHG | BODY MASS INDEX: 25.16 KG/M2 | HEIGHT: 66 IN | SYSTOLIC BLOOD PRESSURE: 147 MMHG | WEIGHT: 156.53 LBS | TEMPERATURE: 98.6 F | HEART RATE: 79 BPM

## 2024-11-14 DIAGNOSIS — E21.0 PRIMARY HYPERPARATHYROIDISM (MULTI): ICD-10-CM

## 2024-11-14 LAB
ABO GROUP (TYPE) IN BLOOD: NORMAL
ANION GAP SERPL CALC-SCNC: 14 MMOL/L (ref 10–20)
ANTIBODY SCREEN: NORMAL
BUN SERPL-MCNC: 15 MG/DL (ref 6–23)
CALCIUM SERPL-MCNC: 12.3 MG/DL (ref 8.6–10.6)
CHLORIDE SERPL-SCNC: 107 MMOL/L (ref 98–107)
CO2 SERPL-SCNC: 23 MMOL/L (ref 21–32)
CREAT SERPL-MCNC: 1.04 MG/DL (ref 0.5–1.05)
EGFRCR SERPLBLD CKD-EPI 2021: 64 ML/MIN/1.73M*2
ERYTHROCYTE [DISTWIDTH] IN BLOOD BY AUTOMATED COUNT: 14.1 % (ref 11.5–14.5)
GLUCOSE SERPL-MCNC: 91 MG/DL (ref 74–99)
HCT VFR BLD AUTO: 45.8 % (ref 36–46)
HGB BLD-MCNC: 14.8 G/DL (ref 12–16)
MCH RBC QN AUTO: 29.5 PG (ref 26–34)
MCHC RBC AUTO-ENTMCNC: 32.3 G/DL (ref 32–36)
MCV RBC AUTO: 91 FL (ref 80–100)
NRBC BLD-RTO: 0 /100 WBCS (ref 0–0)
PLATELET # BLD AUTO: 244 X10*3/UL (ref 150–450)
POTASSIUM SERPL-SCNC: 5.2 MMOL/L (ref 3.5–5.3)
RBC # BLD AUTO: 5.01 X10*6/UL (ref 4–5.2)
RH FACTOR (ANTIGEN D): NORMAL
SODIUM SERPL-SCNC: 139 MMOL/L (ref 136–145)
WBC # BLD AUTO: 6.4 X10*3/UL (ref 4.4–11.3)

## 2024-11-14 PROCEDURE — 87081 CULTURE SCREEN ONLY: CPT

## 2024-11-14 PROCEDURE — 82374 ASSAY BLOOD CARBON DIOXIDE: CPT

## 2024-11-14 PROCEDURE — 86901 BLOOD TYPING SEROLOGIC RH(D): CPT

## 2024-11-14 PROCEDURE — 99203 OFFICE O/P NEW LOW 30 MIN: CPT | Performed by: NURSE ANESTHETIST, CERTIFIED REGISTERED

## 2024-11-14 PROCEDURE — 36415 COLL VENOUS BLD VENIPUNCTURE: CPT

## 2024-11-14 PROCEDURE — 85027 COMPLETE CBC AUTOMATED: CPT

## 2024-11-14 RX ORDER — CHLORHEXIDINE GLUCONATE ORAL RINSE 1.2 MG/ML
15 SOLUTION DENTAL DAILY
Qty: 30 ML | Refills: 0 | Status: SHIPPED | OUTPATIENT
Start: 2024-11-14 | End: 2024-11-23 | Stop reason: HOSPADM

## 2024-11-14 RX ORDER — CHLORHEXIDINE GLUCONATE 40 MG/ML
1 SOLUTION TOPICAL DAILY
Qty: 25 ML | Refills: 0 | Status: SHIPPED | OUTPATIENT
Start: 2024-11-14 | End: 2024-11-23 | Stop reason: HOSPADM

## 2024-11-14 ASSESSMENT — DUKE ACTIVITY SCORE INDEX (DASI)
CAN YOU RUN A SHORT DISTANCE: YES
DASI METS SCORE: 9.9
CAN YOU HAVE SEXUAL RELATIONS: YES
CAN YOU CLIMB A FLIGHT OF STAIRS OR WALK UP A HILL: YES
CAN YOU TAKE CARE OF YOURSELF (EAT, DRESS, BATHE, OR USE TOILET): YES
CAN YOU DO YARD WORK LIKE RAKING LEAVES, WEEDING OR PUSHING A MOWER: YES
CAN YOU DO MODERATE WORK AROUND THE HOUSE LIKE VACUUMING, SWEEPING FLOORS OR CARRYING GROCERIES: YES
CAN YOU PARTICIPATE IN MODERATE RECREATIONAL ACTIVITIES LIKE GOLF, BOWLING, DANCING, DOUBLES TENNIS OR THROWING A BASEBALL OR FOOTBALL: YES
CAN YOU DO HEAVY WORK AROUND THE HOUSE LIKE SCRUBBING FLOORS OR LIFTING AND MOVING HEAVY FURNITURE: YES
CAN YOU WALK INDOORS, SUCH AS AROUND YOUR HOUSE: YES
CAN YOU DO LIGHT WORK AROUND THE HOUSE LIKE DUSTING OR WASHING DISHES: YES
CAN YOU WALK A BLOCK OR TWO ON LEVEL GROUND: YES
TOTAL_SCORE: 58.2
CAN YOU PARTICIPATE IN STRENOUS SPORTS LIKE SWIMMING, SINGLES TENNIS, FOOTBALL, BASKETBALL, OR SKIING: YES

## 2024-11-14 ASSESSMENT — ENCOUNTER SYMPTOMS
MUSCULOSKELETAL NEGATIVE: 1
RESPIRATORY NEGATIVE: 1
NEUROLOGICAL NEGATIVE: 1
NECK NEGATIVE: 1
GASTROINTESTINAL NEGATIVE: 1
ENDOCRINE NEGATIVE: 1
EYES NEGATIVE: 1
CARDIOVASCULAR NEGATIVE: 1
CONSTITUTIONAL NEGATIVE: 1

## 2024-11-14 NOTE — PREPROCEDURE INSTRUCTIONS
Medication List            Accurate as of November 14, 2024  9:14 AM. Always use your most recent med list.                * apixaban 5 mg tablet  Commonly known as: Eliquis  Take 1 tablet (5 mg) by mouth 2 times a day.     * apixaban 5 mg tablet  Commonly known as: Eliquis  Take 1 tablet (5 mg) by mouth 2 times a day.     cinacalcet 30 mg tablet  Commonly known as: Sensipar  Take 1 tablet (30 mg) by mouth once daily. Take with food or shortly afer a meal. Swallow tablet whole; do not break or divide.           * This list has 2 medication(s) that are the same as other medications prescribed for you. Read the directions carefully, and ask your doctor or other care provider to review them with you.                         Thank you for visiting [facility] today for your pre-procedure evaluation, you were seen by Ayleen Young CRNA.    This summary includes instructions and information to aid you during your perioperative period.  Please read carefully. If you have any questions about your visit today, please call the number listed above.  If you become ill or have any changes to your health before your surgery, please contact your primary care provider and alert your surgeon.    Preparing for your Surgery       Preoperative Brain Exercises    What are brain exercises?  A brain exercise is any activity that engages your thinking (cognitive) skills.    What types of activities are considered brain exercises?  Jigsaw puzzles, crossword puzzles, word jumble, memory games, word search, and many more.  Many can be found free online or on your phone via a mobile amos.    Why should I do brain exercises before my surgery?  More recent research has shown brain exercise before surgery can lower the risk of postoperative delirium (confusion) which can be especially important for older adults.  Patients who did brain exercises for 5 to 10 hours the days before surgery, cut their risk of  postoperative delirium in half up to 1 week after surgery.    Sit-to-Stand Exercise    What is the sit-to-stand exercise?  The sit-to-stand exercise strengthens the muscles of your lower body and muscles in the center of your body (core muscles for stability) helping to maintain and improve your strength and mobility.  How do I do the sit-to-stand exercise?  The goal is to do this exercise without using your arms or hands.  If this is too difficult, use your arms and hands or a chair with armrests to help slowly push yourself to the standing position and lower yourself back to the sitting position. As the movement becomes easier use your arms and hands less.    Steps to the sit-to-stand exercise  Sit up tall in a sturdy chair, knees bent, feet flat on the floor shoulder-width apart.  Shift your hips/pelvis forward in the chair to correctly position yourself for the next movement.  Lean forward at your hips.  Stand up straight putting equal weight on both feet.  Check to be sure you are properly aligned with the chair, in a slow controlled movement sit back down.  Repeat this exercise 10-15 times.  If needed you can do it fewer times until your strength improves.  Rest for 1 minute.  Do another 10-15 sit-to-stand exercises.  Try to do this in the morning and evening.        Instructions    Preoperative Fasting Guidelines    Why must I stop eating and drinking near surgery time?  With sedation, food or liquid in your stomach can enter your lungs causing serious complications  Food can increase nausea and vomiting  When do I need to stop eating and drinking before my surgery?      Do not eat any food after midnight the night before your surgery/procedure. You may have up to 13.5 ounces of clear liquid until TWO hours before your instructed arrival time to the hospital.  This includes water, black tea/coffee, (no milk or cream) apple juice, and electrolyte drinks (Gatorade). You may chew gum until TWO hours before your  surgery/procedure            Simple things you can do to help prevent blood clots     Blood clots are blockages that can form in the body's veins. When a blood clot forms in your deep veins, it may be called a deep vein thrombosis, or DVT for short. Blood clots can happen in any part of the body where blood flows, but they are most common in the arms and legs. If a piece of a blood clot breaks free and travels to the lungs, it is called a pulmonary embolus (PE). A PE can be a very serious problem.         Being in the hospital or having surgery can raise your chances of getting a blood clot because you may not be well enough to move around as much as you normally do.         Ways you can help prevent blood clots in the hospital       Wearing SCDs  SCDs stands for Sequential Compression Devices.   SCDs are special sleeves that wrap around your legs. They attach to a pump that fills them with air to gently squeeze your legs every few minutes.  This helps return the blood in your legs to your heart.   SCDs should only be taken off when walking or bathing. SCDs may not be comfortable, but they can help save your life.              Pump SCD leg sleeves  Wearing compression stockings - if your doctor orders them. These special snug-fitting stockings gently squeeze your legs to help blood flow.       Walking. Walking helps move the blood in your legs.   If your doctor says it is ok, try walking the halls at least   5 times a day. Ask us to help you get up, so you don't fall.      Taking any blood-thinning medicines your doctor orders.              Ways you can help prevent blood clots at home         Wearing compression stockings - if your doctor orders them.   Walking - to help move the blood in your legs.    Taking any blood-thinning medicines your doctor orders.      Signs of a blood clot or PE    Tell your doctor or nurse right away if you have any of the problems listed below.         If you are at home, seek medical  care right away. Call 911 for chest pain or problems breathing.            Signs of a blood clot (DVT) - such as pain, swelling, redness, or warmth in your arm or legs.  Signs of a pulmonary embolism (PE) - such as chest pain or feeling short of breath      Tobacco and Alcohol;  Do not drink alcohol or smoke within 24 hours of surgery.  It is best to quit smoking for as long as possible before any surgery or procedure.         Other Instructions  Body Wash; What is a home preoperative antibacterial shower? This shower is a way of cleaning the skin with a germ-killing solution before surgery.  The solution contains chlorhexidine, commonly known as CHG.  CHG is a skin cleanser with germ-killing ability.  Let your doctor know if you are allergic to chlorhexidine. Why do I need to take a preoperative antibacterial shower? Skin is not sterile.  It is best to try to make your skin as free of germs as possible before surgery.  Proper cleansing with a germ-killing soap before surgery can lower the number of germs on your skin.  This helps to reduce the risk of infection at the surgical site.  Following the instructions listed below will help you prepare your skin for surgery.   How do I use the solution? Steps:  Begin using your CHG soap 5 days before your scheduled surgery on ___________.   First, wash and rinse your hair using the CHG soap. Keep CHG soap away from ear canals and eyes.  Rinse completely, do not condition.  Hair extensions should be removed. , Oral/Dental Rinse: What is oral/dental rinse?  It is a mouthwash. It is a way of cleaning the mouth with a germ-killing solution before your surgery.  The solution contains chlorhexidine, commonly known as CHG. It is used inside the mouth to kill a bacteria known as Staphylococcus aureus.  Let your doctor know if you are allergic to Chlorhexidine. Why do I need to use CHG oral/dental rinse? The CHG oral/dental rinse helps to kill a bacteria in your mouth known as  Staphylococcus aureus.  This reduces the risk of infection at the surgical site.  Using your CHG oral/dental rinse STEPS: Use your CHG oral/dental rinse after you brush your teeth the night before (at bedtime) and the morning of your surgery.  Follow all directions on your prescription label.  Use the cap on the container to measure 15 ml.  Swish (gargle if you can) the mouthwash in your mouth for at least 30 seconds, (do not swallow) and spit out.  After you use your CHG rinse, do not rinse your mouth with water, drink or eat.  Please refer to the prescription label for the appropriate time to resume oral intake What side effects might I have using the CHG oral/dental rinse? CHG rinse will stick to plaque on the teeth.  Brush and floss just before use.  Teeth brushing will help avoid staining of plaque during use.           The Week before Surgery        Seven days before Surgery  Check your [facility] medication instructions  Do the exercises provided to you by [facility]   Arrange for a responsible, adult licensed  to take you home after surgery and stay with you for 24 hours.  You will not be permitted to drive yourself home if you have received any anesthetic/sedation  Six days before surgery  Check your [facility] medication instructions  Do the exercises provided to you by [facility]   Start using Chlorhexidene (CHG) body wash if prescribed  Five days before surgery  Check your [facility] medication instructions  Do the exercises provided to you by [facility]   Continue to use CHG body wash if prescribed  Three days before surgery  Check your [facility] medication instructions  Do the exercises provided to you by [facility]   Continue to use CHG body wash if prescribed  Two days before surgery  Check your [facility] medication instructions  Do the exercises provided to you by [facility]   Continue to use CHG body wash if prescribed    The Day before Surgery       Check your [facility] medication and all other [facility] instructions including when to stop eating and drinking  You will  be called with your arrival time for surgery in the late afternoon.  If you do not receive a call please reach out to your surgeon's office.  Do not smoke or drink 24 hours before surgery  Prepare items to bring with you to the hospital  Shower with your chlorhexidine wash if prescribed  Brush your teeth and use your chlorhexidine dental rinse if prescribed    The Day of Surgery       Check your [facility] medication instructions  Ensure you follow the instructions for when to stop eating and drinking  Shower, if prescribed use CHG.  Do not apply any lotions, creams, moisturizers, perfume or deodorant  Brush your teeth and use your CHG dental rinse if prescribed  Wear loose comfortable clothing  Avoid make-up  Remove  jewelry and piercings, consider professional piercing removal with a plastic spacer if needed  Bring photo ID and Insurance card  Bring an accurate medication list that includes medication dose, frequency and allergies  Bring a copy of your advanced directives (will, health care power of )  Bring any devices and controllers as well as medical devices you have been provided with for surgery (CPAP, slings, braces, etc.)  Dentures, eyeglasses, and contacts will be removed before surgery, please bring cases for contacts or glasses

## 2024-11-14 NOTE — CPM/PAT H&P
CPM/PAT Evaluation       Name: Jolly Alvarado (Jolly Alvarado)  /Age: 1970/54 y.o.     In-Person     54 y.o.  female  scheduled for parathyroidectomy on 24 with Dr. Sanchez for hyperparathyroidism.  PMHX includes DVT, pulmonary embolism, idiopathic hyperparathyroidism, kidney stones.  Presents to [facility] today for perioperative risk stratification and optimization    No past medical history on file.    Past Surgical History:   Procedure Laterality Date    APPENDECTOMY      KIDNEY SURGERY      X 3       Patient  has no history on file for sexual activity.    Family History   Problem Relation Name Age of Onset    No Known Problems Mother      Nephrolithiasis Father         Allergies   Allergen Reactions    Levofloxacin Nausea/vomiting       Prior to Admission medications    Medication Sig Start Date End Date Taking? Authorizing Provider   apixaban (Eliquis) 5 mg tablet Take 1 tablet (5 mg) by mouth 2 times a day. 10/8/24  Yes CARLOTA Cagle   cinacalcet (Sensipar) 30 mg tablet Take 1 tablet (30 mg) by mouth once daily. Take with food or shortly afer a meal. Swallow tablet whole; do not break or divide. 24 Yes CARLOTA Gomez, TSEPHANIE   apixaban (Eliquis) 5 mg tablet Take 1 tablet (5 mg) by mouth 2 times a day. 10/8/24 12/7/24  CARLOTA Cagle   chlorhexidine (Hibiclens) 4 % external liquid Apply 1 Application topically once daily for 5 doses. Use per [facility]/PAT provided instructions 24  ARGENTINA La   chlorhexidine (Peridex) 0.12 % solution Use 15 mL in the mouth or throat once daily for 2 doses. 24  ARG[place]INA La   cinacalcet (Sensipar) 30 mg tablet TAKE 1 TABLET BY MOUTH TWICE  DAILY . TAKE WITH FOOD OR  SHORTLY AFTER A MEAL. SWALLOW  TABLET WHOLE; DO NOT BREAK OR  DIVIDE 10/8/24 11/11/24  DO BERNARD Woodward ROS:   Constitutional:   neg    Neuro/Psych:   neg    Eyes:   neg    Ears:   neg    Nose:   neg     Mouth:   neg    Throat:   neg    Neck:   neg    Cardio:   neg    Respiratory:   neg    Endocrine:   neg    GI:   neg    :   neg    Musculoskeletal:   neg    Hematologic:   neg    Skin:  neg        Physical Exam  Vitals reviewed.   Constitutional:       Appearance: Normal appearance. She is normal weight.   HENT:      Head: Normocephalic and atraumatic.      Right Ear: Ear canal and external ear normal.      Left Ear: Ear canal and external ear normal.      Nose: Nose normal.      Mouth/Throat:      Mouth: Mucous membranes are moist.      Pharynx: Oropharynx is clear.   Eyes:      Extraocular Movements: Extraocular movements intact.      Conjunctiva/sclera: Conjunctivae normal.   Cardiovascular:      Rate and Rhythm: Normal rate and regular rhythm.      Pulses: Normal pulses.      Heart sounds: Normal heart sounds.   Pulmonary:      Effort: Pulmonary effort is normal.      Breath sounds: Normal breath sounds.   Abdominal:      General: Abdomen is flat. Bowel sounds are normal.      Palpations: Abdomen is soft.   Genitourinary:     Comments: Physical exam deferred, but reported normal per patient.  Musculoskeletal:         General: Normal range of motion.      Cervical back: Normal range of motion and neck supple.   Skin:     General: Skin is warm.      Capillary Refill: Capillary refill takes less than 2 seconds.   Neurological:      General: No focal deficit present.      Mental Status: She is alert and oriented to person, place, and time. Mental status is at baseline.   Psychiatric:         Mood and Affect: Mood normal.         Behavior: Behavior normal.         Thought Content: Thought content normal.         Judgment: Judgment normal.          PAT AIRWAY:   Airway:     Mallampati::  II    TM distance::  >3 FB    Neck ROM::  Full  normal        Testing/Diagnostic:     Patient Specialist/PCP:     Visit Vitals  /82   Pulse 79   Temp 37 °C (98.6 °F) (Temporal)       DASI Risk Score      Flowsheet Row  Pre-Admission Testing from 11/14/2024 in [facility]   Can you take care of yourself (eat, dress, bathe, or use toilet)?  2.75 filed at 11/14/2024 0843   Can you walk indoors, such as around your house? 1.75 filed at 11/14/2024 0843   Can you walk a block or two on level ground?  2.75 filed at 11/14/2024 0843   Can you climb a flight of stairs or walk up a hill? 5.5 filed at 11/14/2024 0843   Can you run a short distance? 8 filed at 11/14/2024 0843   Can you do light work around the house like dusting or washing dishes? 2.7 filed at 11/14/2024 0843   Can you do moderate work around the house like vacuuming, sweeping floors or carrying groceries? 3.5 filed at 11/14/2024 0843   Can you do heavy work around the house like scrubbing floors or lifting and moving heavy furniture?  8 filed at 11/14/2024 0843   Can you do yard work like raking leaves, weeding or pushing a mower? 4.5 filed at 11/14/2024 0843   Can you have sexual relations? 5.25 filed at 11/14/2024 0843   Can you participate in moderate recreational activities like golf, bowling, dancing, doubles tennis or throwing a baseball or football? 6 filed at 11/14/2024 0843   Can you participate in strenous sports like swimming, singles tennis, football, basketball, or skiing? 7.5 filed at 11/14/2024 0843   DASI SCORE 58.2 filed at 11/14/2024 0843   METS Score (Will be calculated only when all the questions are answered) 9.9 filed at 11/14/2024 0843          Caprini DVT Assessment      Flowsheet Row ED to Hosp-Admission (Discharged) from 7/30/2024 in [facility] 3 with Cuba Mcbride MD and Jorge Jean, DO   DVT Score 4 filed at 07/30/2024 1708   BMI 30 or less filed at 07/30/2024 1708   RETIRED: History SVT, DVT/PE filed at 07/30/2024 1708          Modified Frailty Index    No data to display       CHADS2 Stroke Risk  Current as of 31 minutes ago        N/A 3 to 100%: High Risk   2 to < 3%: Medium Risk   0 to < 2%: Low  Risk     Last Change: N/A          This score determines the patient's risk of having a stroke if the patient has atrial fibrillation.        This score is not applicable to this patient. Components are not calculated.          Revised Cardiac Risk Index    No data to display       Apfel Simplified Score    No data to display       Risk Analysis Index Results This Encounter    No data found in the last 10 encounters.       Stop Bang Score      Flowsheet Row Pre-Admission Testing from 11/14/2024 in [facility]   Do you snore loudly? 0 filed at 11/14/2024 0843   Do you often feel tired or fatigued after your sleep? 0 filed at 11/14/2024 0843   Has anyone ever observed you stop breathing in your sleep? 0 filed at 11/14/2024 0843   Do you have or are you being treated for high blood pressure? 0 filed at 11/14/2024 0843   Recent BMI (Calculated) 25.6 filed at 11/14/2024 0843   Is BMI greater than 35 kg/m2? 0=No filed at 11/14/2024 0843   Age older than 50 years old? 1=Yes filed at 11/14/2024 0843   Is your neck circumference greater than 17 inches (Male) or 16 inches (Female)? 0 filed at 11/14/2024 0843   Gender - Male 0=No filed at 11/14/2024 0843   STOP-BANG Total Score 1 filed at 11/14/2024 0843          Prodigy: High Risk  Total Score: 0          ARISCAT Score for Postoperative Pulmonary Complications    No data to display       Ryan Perioperative Risk for Myocardial Infarction or Cardiac Arrest (ELVIRA)    No data to display       Results for orders placed or performed in visit on 11/14/24 (from the past 24 hours)   Basic Metabolic Panel   Result Value Ref Range    Glucose 91 74 - 99 mg/dL    Sodium 139 136 - 145 mmol/L    Potassium 5.2 3.5 - 5.3 mmol/L    Chloride 107 98 - 107 mmol/L    Bicarbonate 23 21 - 32 mmol/L    Anion Gap 14 10 - 20 mmol/L    Urea Nitrogen 15 6 - 23 mg/dL    Creatinine 1.04 0.50 - 1.05 mg/dL    eGFR 64 >60 mL/min/1.73m*2    Calcium 12.3 (H) 8.6 - 10.6 mg/dL   CBC   Result  Value Ref Range    WBC 6.4 4.4 - 11.3 x10*3/uL    nRBC 0.0 0.0 - 0.0 /100 WBCs    RBC 5.01 4.00 - 5.20 x10*6/uL    Hemoglobin 14.8 12.0 - 16.0 g/dL    Hematocrit 45.8 36.0 - 46.0 %    MCV 91 80 - 100 fL    MCH 29.5 26.0 - 34.0 pg    MCHC 32.3 32.0 - 36.0 g/dL    RDW 14.1 11.5 - 14.5 %    Platelets 244 150 - 450 x10*3/uL        Assessment and Plan:    Neuro:  No neurologic diagnosis or significant findings on chart review, clinical presentation and evaluation.  No grossly apparent neurologic perioperative risk.  Patient instructed on and provided cognitive exercises  Patient is not at increased risk for perioperative CVA  perioperative interruption of anticoagulant    HEENT:  No HEENT diagnosis or significant findings on chart review or clinical presentation and evaluation. No further preoperative testing/intervention indicated at this time.    Cardiovascular:  No CV diagnosis or significant findings on chart review or clinical presentation and evaluation. No further preoperative testing or intervention is indicated at this time.  METS: 9.9  RCRI: 0 points, 3.9%  risk for postoperative MACE   ELVIRA: 0.1% risk for 30 day postoperative MACE  EKG - SR with first degree block    Pulmonary:  No pulmonary diagnosis or significant findings on chart review or clinical presentation.  No further preoperative testing is indicated at this time.  Tobacco abuse  Stop Bang score is 1 placing patient at low risk for JANNET  ARISCAT: <26 points, 1.6% risk of in-hospital postoperative pulmonary complication  PRODIGY: Low risk for opioid induced respiratory depression  Pumonary toilet education discussed, patient also provided deep breathing exercises.    Renal:   No renal diagnosis or significant findings on chart review, clinical presentation or evaluation. No further preoperative testing/intervention is indicated at this time.  Age equal to or greater than 56    Endocrine:  No endocrine diagnosis or significant findings on chart review  or clinical presentation and evaluation. No further testing or intervention is indicated at this time.    Hematologic:  No hematologic diagnosis or significant findings on chart review or clinical presentation and evaluation. No further testing or intervention is indicated at this time.   Caprini Score 7, patient at High risk for perioperative DVT.  Patient provided with VTE education/handout.    Gastrointestinal:   No GI diagnosis or significant findings on chart review or clinical presentation and evaluation.   Eat-10 score 0  Apfel 2    Infectious disease:   No infectious diagnosis or significant findings on chart review or clinical presentation and evaluation.   Prescription provided for CHG body wash and dental rinse. CHG use instructions reviewed and provided to patient.  Staph screen collected    Musculoskeletal:   No diagnosis or significant findings on chart review or clinical presentation and evaluation.     Anesthesia/Airway:  No anesthesia complications      Medication instructions and NPO guidelines reviewed with the patient.  All questions or concerns discussed and addressed.      Patient seen and staffed with Dr. Cronin

## 2024-11-15 ENCOUNTER — TELEPHONE (OUTPATIENT)
Dept: PRIMARY CARE | Facility: CLINIC | Age: 54
End: 2024-11-15
Payer: COMMERCIAL

## 2024-11-15 LAB — STAPHYLOCOCCUS SPEC CULT: NORMAL

## 2024-11-15 NOTE — TELEPHONE ENCOUNTER
Pt just wanted to call and let you know her thyroid surgery is scheduled for next week on 11/22 - is set to stop the blood thinner 2 days prior and the cinacalcet 1 day prior, then will start ASA 81 mg after surgery. Stated you can call if you have any questions.

## 2024-11-18 DIAGNOSIS — I82.4Y2 ACUTE DEEP VEIN THROMBOSIS (DVT) OF PROXIMAL VEIN OF LEFT LOWER EXTREMITY (MULTI): ICD-10-CM

## 2024-11-19 RX ORDER — APIXABAN 5 MG/1
5 TABLET, FILM COATED ORAL 2 TIMES DAILY
Qty: 120 TABLET | Refills: 5 | Status: SHIPPED | OUTPATIENT
Start: 2024-11-19

## 2024-11-21 ENCOUNTER — ANESTHESIA EVENT (OUTPATIENT)
Dept: OPERATING ROOM | Facility: HOSPITAL | Age: 54
End: 2024-11-21
Payer: COMMERCIAL

## 2024-11-22 ENCOUNTER — ANESTHESIA (OUTPATIENT)
Dept: OPERATING ROOM | Facility: HOSPITAL | Age: 54
End: 2024-11-22
Payer: COMMERCIAL

## 2024-11-22 ENCOUNTER — HOSPITAL ENCOUNTER (OUTPATIENT)
Facility: HOSPITAL | Age: 54
Discharge: HOME | End: 2024-11-23
Attending: SURGERY | Admitting: SURGERY
Payer: COMMERCIAL

## 2024-11-22 DIAGNOSIS — E21.0 PRIMARY HYPERPARATHYROIDISM (MULTI): Primary | ICD-10-CM

## 2024-11-22 DIAGNOSIS — G89.18 ACUTE POSTOPERATIVE PAIN: ICD-10-CM

## 2024-11-22 PROBLEM — N20.0 NEPHROLITHIASIS: Status: ACTIVE | Noted: 2024-11-22

## 2024-11-22 PROBLEM — E21.3 HYPERPARATHYROIDISM (MULTI): Status: ACTIVE | Noted: 2024-11-22

## 2024-11-22 LAB
ABO GROUP (TYPE) IN BLOOD: NORMAL
CALCIUM SERPL-MCNC: 9.1 MG/DL (ref 8.6–10.6)
PREGNANCY TEST URINE, POC: NEGATIVE
PTH-INTACT IO % DIF SERPL: 128.8 PG/ML (ref 18.5–88)
PTH-INTACT IO % DIF SERPL: 39.8 PG/ML (ref 18.5–88)
PTH-INTACT SERPL-MCNC: 377.4 PG/ML (ref 18.5–88)
PTH-INTACT SERPL-MCNC: 51 PG/ML (ref 18.5–88)
RH FACTOR (ANTIGEN D): NORMAL

## 2024-11-22 PROCEDURE — 2720000007 HC OR 272 NO HCPCS: Performed by: SURGERY

## 2024-11-22 PROCEDURE — 2500000004 HC RX 250 GENERAL PHARMACY W/ HCPCS (ALT 636 FOR OP/ED): Performed by: STUDENT IN AN ORGANIZED HEALTH CARE EDUCATION/TRAINING PROGRAM

## 2024-11-22 PROCEDURE — 3600000008 HC OR TIME - EACH INCREMENTAL 1 MINUTE - PROCEDURE LEVEL THREE: Performed by: SURGERY

## 2024-11-22 PROCEDURE — 2500000004 HC RX 250 GENERAL PHARMACY W/ HCPCS (ALT 636 FOR OP/ED): Performed by: SURGERY

## 2024-11-22 PROCEDURE — 36415 COLL VENOUS BLD VENIPUNCTURE: CPT | Performed by: SURGERY

## 2024-11-22 PROCEDURE — 60500 EXPLORE PARATHYROID GLANDS: CPT | Performed by: SURGERY

## 2024-11-22 PROCEDURE — 3700000002 HC GENERAL ANESTHESIA TIME - EACH INCREMENTAL 1 MINUTE: Performed by: SURGERY

## 2024-11-22 PROCEDURE — 2500000001 HC RX 250 WO HCPCS SELF ADMINISTERED DRUGS (ALT 637 FOR MEDICARE OP): Performed by: STUDENT IN AN ORGANIZED HEALTH CARE EDUCATION/TRAINING PROGRAM

## 2024-11-22 PROCEDURE — 3700000001 HC GENERAL ANESTHESIA TIME - INITIAL BASE CHARGE: Performed by: SURGERY

## 2024-11-22 PROCEDURE — 2500000004 HC RX 250 GENERAL PHARMACY W/ HCPCS (ALT 636 FOR OP/ED)

## 2024-11-22 PROCEDURE — 83970 ASSAY OF PARATHORMONE: CPT | Performed by: SURGERY

## 2024-11-22 PROCEDURE — 0753T DGTZ GLS MCRSCP SLD LEVEL IV: CPT | Mod: TC,SUR | Performed by: SURGERY

## 2024-11-22 PROCEDURE — 7100000001 HC RECOVERY ROOM TIME - INITIAL BASE CHARGE: Performed by: SURGERY

## 2024-11-22 PROCEDURE — 7100000011 HC EXTENDED STAY RECOVERY HOURLY - NURSING UNIT

## 2024-11-22 PROCEDURE — 3600000003 HC OR TIME - INITIAL BASE CHARGE - PROCEDURE LEVEL THREE: Performed by: SURGERY

## 2024-11-22 PROCEDURE — 82310 ASSAY OF CALCIUM: CPT | Performed by: STUDENT IN AN ORGANIZED HEALTH CARE EDUCATION/TRAINING PROGRAM

## 2024-11-22 PROCEDURE — 2500000005 HC RX 250 GENERAL PHARMACY W/O HCPCS

## 2024-11-22 PROCEDURE — 36415 COLL VENOUS BLD VENIPUNCTURE: CPT | Performed by: STUDENT IN AN ORGANIZED HEALTH CARE EDUCATION/TRAINING PROGRAM

## 2024-11-22 PROCEDURE — 81025 URINE PREGNANCY TEST: CPT | Performed by: SURGERY

## 2024-11-22 PROCEDURE — 88305 TISSUE EXAM BY PATHOLOGIST: CPT | Performed by: PATHOLOGY

## 2024-11-22 PROCEDURE — 7100000002 HC RECOVERY ROOM TIME - EACH INCREMENTAL 1 MINUTE: Performed by: SURGERY

## 2024-11-22 RX ORDER — CALCIUM CARBONATE 1250 MG/5ML
2500 SUSPENSION ORAL 3 TIMES DAILY PRN
Status: DISCONTINUED | OUTPATIENT
Start: 2024-11-22 | End: 2024-11-23 | Stop reason: HOSPADM

## 2024-11-22 RX ORDER — OXYCODONE HYDROCHLORIDE 5 MG/1
10 TABLET ORAL EVERY 4 HOURS PRN
Status: DISCONTINUED | OUTPATIENT
Start: 2024-11-22 | End: 2024-11-22 | Stop reason: HOSPADM

## 2024-11-22 RX ORDER — ONDANSETRON HYDROCHLORIDE 2 MG/ML
4 INJECTION, SOLUTION INTRAVENOUS ONCE AS NEEDED
Status: DISCONTINUED | OUTPATIENT
Start: 2024-11-22 | End: 2024-11-22 | Stop reason: HOSPADM

## 2024-11-22 RX ORDER — CALCIUM CARBONATE 500(1250)
2500 TABLET ORAL 3 TIMES DAILY PRN
Status: DISCONTINUED | OUTPATIENT
Start: 2024-11-22 | End: 2024-11-23 | Stop reason: HOSPADM

## 2024-11-22 RX ORDER — MAGNESIUM SULFATE HEPTAHYDRATE 40 MG/ML
INJECTION, SOLUTION INTRAVENOUS AS NEEDED
Status: DISCONTINUED | OUTPATIENT
Start: 2024-11-22 | End: 2024-11-22

## 2024-11-22 RX ORDER — LIDOCAINE HYDROCHLORIDE 20 MG/ML
INJECTION, SOLUTION INFILTRATION; PERINEURAL AS NEEDED
Status: DISCONTINUED | OUTPATIENT
Start: 2024-11-22 | End: 2024-11-22

## 2024-11-22 RX ORDER — CALCIUM GLUCONATE 20 MG/ML
1 INJECTION, SOLUTION INTRAVENOUS 3 TIMES DAILY PRN
Status: DISCONTINUED | OUTPATIENT
Start: 2024-11-22 | End: 2024-11-23 | Stop reason: HOSPADM

## 2024-11-22 RX ORDER — ACETAMINOPHEN 325 MG/1
650 TABLET ORAL EVERY 6 HOURS
Status: DISCONTINUED | OUTPATIENT
Start: 2024-11-22 | End: 2024-11-23 | Stop reason: HOSPADM

## 2024-11-22 RX ORDER — LABETALOL HYDROCHLORIDE 5 MG/ML
5 INJECTION, SOLUTION INTRAVENOUS ONCE AS NEEDED
Status: DISCONTINUED | OUTPATIENT
Start: 2024-11-22 | End: 2024-11-22 | Stop reason: HOSPADM

## 2024-11-22 RX ORDER — ASPIRIN 81 MG/1
81 TABLET ORAL 2 TIMES DAILY
Status: ON HOLD | COMMUNITY
End: 2024-11-22 | Stop reason: ENTERED-IN-ERROR

## 2024-11-22 RX ORDER — PHENYLEPHRINE HCL IN 0.9% NACL 0.4MG/10ML
SYRINGE (ML) INTRAVENOUS AS NEEDED
Status: DISCONTINUED | OUTPATIENT
Start: 2024-11-22 | End: 2024-11-22

## 2024-11-22 RX ORDER — TRAMADOL HYDROCHLORIDE 50 MG/1
50 TABLET ORAL EVERY 6 HOURS PRN
Status: DISCONTINUED | OUTPATIENT
Start: 2024-11-22 | End: 2024-11-23 | Stop reason: HOSPADM

## 2024-11-22 RX ORDER — OXYCODONE HYDROCHLORIDE 5 MG/1
10 TABLET ORAL EVERY 4 HOURS PRN
Status: DISCONTINUED | OUTPATIENT
Start: 2024-11-22 | End: 2024-11-23 | Stop reason: HOSPADM

## 2024-11-22 RX ORDER — ONDANSETRON HYDROCHLORIDE 2 MG/ML
INJECTION, SOLUTION INTRAVENOUS AS NEEDED
Status: DISCONTINUED | OUTPATIENT
Start: 2024-11-22 | End: 2024-11-22

## 2024-11-22 RX ORDER — ASPIRIN 81 MG/1
81 TABLET ORAL 2 TIMES DAILY
Status: DISCONTINUED | OUTPATIENT
Start: 2024-11-22 | End: 2024-11-23 | Stop reason: HOSPADM

## 2024-11-22 RX ORDER — POLYETHYLENE GLYCOL 3350 17 G/17G
17 POWDER, FOR SOLUTION ORAL DAILY
Status: DISCONTINUED | OUTPATIENT
Start: 2024-11-22 | End: 2024-11-23 | Stop reason: HOSPADM

## 2024-11-22 RX ORDER — FENTANYL CITRATE 50 UG/ML
INJECTION, SOLUTION INTRAMUSCULAR; INTRAVENOUS AS NEEDED
Status: DISCONTINUED | OUTPATIENT
Start: 2024-11-22 | End: 2024-11-22

## 2024-11-22 RX ORDER — TRAMADOL HYDROCHLORIDE 50 MG/1
50 TABLET ORAL EVERY 6 HOURS PRN
Qty: 15 TABLET | Refills: 0 | Status: SHIPPED | OUTPATIENT
Start: 2024-11-22

## 2024-11-22 RX ORDER — ROCURONIUM BROMIDE 10 MG/ML
INJECTION, SOLUTION INTRAVENOUS AS NEEDED
Status: DISCONTINUED | OUTPATIENT
Start: 2024-11-22 | End: 2024-11-22

## 2024-11-22 RX ORDER — ONDANSETRON HYDROCHLORIDE 2 MG/ML
8 INJECTION, SOLUTION INTRAVENOUS EVERY 6 HOURS PRN
Status: DISCONTINUED | OUTPATIENT
Start: 2024-11-22 | End: 2024-11-23 | Stop reason: HOSPADM

## 2024-11-22 RX ORDER — OXYCODONE HYDROCHLORIDE 5 MG/1
5 TABLET ORAL EVERY 4 HOURS PRN
Status: DISCONTINUED | OUTPATIENT
Start: 2024-11-22 | End: 2024-11-22 | Stop reason: HOSPADM

## 2024-11-22 RX ORDER — LIDOCAINE HYDROCHLORIDE 10 MG/ML
0.1 INJECTION, SOLUTION EPIDURAL; INFILTRATION; INTRACAUDAL; PERINEURAL ONCE
Status: DISCONTINUED | OUTPATIENT
Start: 2024-11-22 | End: 2024-11-22 | Stop reason: HOSPADM

## 2024-11-22 RX ORDER — PROPOFOL 10 MG/ML
INJECTION, EMULSION INTRAVENOUS AS NEEDED
Status: DISCONTINUED | OUTPATIENT
Start: 2024-11-22 | End: 2024-11-22

## 2024-11-22 RX ORDER — NALOXONE HYDROCHLORIDE 0.4 MG/ML
0.2 INJECTION, SOLUTION INTRAMUSCULAR; INTRAVENOUS; SUBCUTANEOUS EVERY 5 MIN PRN
Status: DISCONTINUED | OUTPATIENT
Start: 2024-11-22 | End: 2024-11-23 | Stop reason: HOSPADM

## 2024-11-22 RX ORDER — MIDAZOLAM HYDROCHLORIDE 1 MG/ML
INJECTION INTRAMUSCULAR; INTRAVENOUS AS NEEDED
Status: DISCONTINUED | OUTPATIENT
Start: 2024-11-22 | End: 2024-11-22

## 2024-11-22 RX ORDER — HYDROMORPHONE HYDROCHLORIDE 0.2 MG/ML
0.2 INJECTION INTRAMUSCULAR; INTRAVENOUS; SUBCUTANEOUS EVERY 5 MIN PRN
Status: DISCONTINUED | OUTPATIENT
Start: 2024-11-22 | End: 2024-11-22 | Stop reason: HOSPADM

## 2024-11-22 RX ORDER — METOCLOPRAMIDE HYDROCHLORIDE 5 MG/ML
10 INJECTION INTRAMUSCULAR; INTRAVENOUS ONCE AS NEEDED
Status: DISCONTINUED | OUTPATIENT
Start: 2024-11-22 | End: 2024-11-22 | Stop reason: HOSPADM

## 2024-11-22 RX ORDER — BUPIVACAINE HYDROCHLORIDE 5 MG/ML
INJECTION, SOLUTION PERINEURAL AS NEEDED
Status: DISCONTINUED | OUTPATIENT
Start: 2024-11-22 | End: 2024-11-22 | Stop reason: HOSPADM

## 2024-11-22 RX ORDER — SODIUM CHLORIDE, SODIUM LACTATE, POTASSIUM CHLORIDE, CALCIUM CHLORIDE 600; 310; 30; 20 MG/100ML; MG/100ML; MG/100ML; MG/100ML
50 INJECTION, SOLUTION INTRAVENOUS CONTINUOUS
Status: DISCONTINUED | OUTPATIENT
Start: 2024-11-22 | End: 2024-11-22 | Stop reason: HOSPADM

## 2024-11-22 RX ADMIN — ACETAMINOPHEN 650 MG: 325 TABLET ORAL at 20:05

## 2024-11-22 RX ADMIN — Medication 4 L/MIN: at 09:15

## 2024-11-22 RX ADMIN — POLYETHYLENE GLYCOL 3350 17 G: 17 POWDER, FOR SOLUTION ORAL at 20:04

## 2024-11-22 RX ADMIN — ASPIRIN 81 MG: 81 TABLET, COATED ORAL at 20:05

## 2024-11-22 SDOH — ECONOMIC STABILITY: HOUSING INSECURITY: IN THE LAST 12 MONTHS, WAS THERE A TIME WHEN YOU WERE NOT ABLE TO PAY THE MORTGAGE OR RENT ON TIME?: NO

## 2024-11-22 SDOH — SOCIAL STABILITY: SOCIAL INSECURITY
WITHIN THE LAST YEAR, HAVE YOU BEEN RAPED OR FORCED TO HAVE ANY KIND OF SEXUAL ACTIVITY BY YOUR PARTNER OR EX-PARTNER?: NO

## 2024-11-22 SDOH — SOCIAL STABILITY: SOCIAL INSECURITY: HAVE YOU HAD THOUGHTS OF HARMING ANYONE ELSE?: NO

## 2024-11-22 SDOH — SOCIAL STABILITY: SOCIAL INSECURITY: DO YOU FEEL ANYONE HAS EXPLOITED OR TAKEN ADVANTAGE OF YOU FINANCIALLY OR OF YOUR PERSONAL PROPERTY?: NO

## 2024-11-22 SDOH — ECONOMIC STABILITY: FOOD INSECURITY: WITHIN THE PAST 12 MONTHS, THE FOOD YOU BOUGHT JUST DIDN'T LAST AND YOU DIDN'T HAVE MONEY TO GET MORE.: NEVER TRUE

## 2024-11-22 SDOH — ECONOMIC STABILITY: FOOD INSECURITY: WITHIN THE PAST 12 MONTHS, YOU WORRIED THAT YOUR FOOD WOULD RUN OUT BEFORE YOU GOT THE MONEY TO BUY MORE.: NEVER TRUE

## 2024-11-22 SDOH — SOCIAL STABILITY: SOCIAL INSECURITY: WITHIN THE LAST YEAR, HAVE YOU BEEN AFRAID OF YOUR PARTNER OR EX-PARTNER?: NO

## 2024-11-22 SDOH — ECONOMIC STABILITY: HOUSING INSECURITY: AT ANY TIME IN THE PAST 12 MONTHS, WERE YOU HOMELESS OR LIVING IN A SHELTER (INCLUDING NOW)?: NO

## 2024-11-22 SDOH — ECONOMIC STABILITY: FOOD INSECURITY: HOW HARD IS IT FOR YOU TO PAY FOR THE VERY BASICS LIKE FOOD, HOUSING, MEDICAL CARE, AND HEATING?: NOT HARD AT ALL

## 2024-11-22 SDOH — ECONOMIC STABILITY: TRANSPORTATION INSECURITY: IN THE PAST 12 MONTHS, HAS LACK OF TRANSPORTATION KEPT YOU FROM MEDICAL APPOINTMENTS OR FROM GETTING MEDICATIONS?: NO

## 2024-11-22 SDOH — SOCIAL STABILITY: SOCIAL INSECURITY: HAS ANYONE EVER THREATENED TO HURT YOUR FAMILY OR YOUR PETS?: NO

## 2024-11-22 SDOH — HEALTH STABILITY: MENTAL HEALTH: CURRENT SMOKER: 0

## 2024-11-22 SDOH — ECONOMIC STABILITY: INCOME INSECURITY: IN THE PAST 12 MONTHS HAS THE ELECTRIC, GAS, OIL, OR WATER COMPANY THREATENED TO SHUT OFF SERVICES IN YOUR HOME?: NO

## 2024-11-22 SDOH — SOCIAL STABILITY: SOCIAL INSECURITY: WITHIN THE LAST YEAR, HAVE YOU BEEN HUMILIATED OR EMOTIONALLY ABUSED IN OTHER WAYS BY YOUR PARTNER OR EX-PARTNER?: NO

## 2024-11-22 SDOH — SOCIAL STABILITY: SOCIAL INSECURITY
WITHIN THE LAST YEAR, HAVE YOU BEEN KICKED, HIT, SLAPPED, OR OTHERWISE PHYSICALLY HURT BY YOUR PARTNER OR EX-PARTNER?: NO

## 2024-11-22 SDOH — ECONOMIC STABILITY: HOUSING INSECURITY: IN THE PAST 12 MONTHS, HOW MANY TIMES HAVE YOU MOVED WHERE YOU WERE LIVING?: 0

## 2024-11-22 SDOH — SOCIAL STABILITY: SOCIAL INSECURITY: ABUSE: ADULT

## 2024-11-22 SDOH — SOCIAL STABILITY: SOCIAL INSECURITY: DO YOU FEEL UNSAFE GOING BACK TO THE PLACE WHERE YOU ARE LIVING?: NO

## 2024-11-22 SDOH — SOCIAL STABILITY: SOCIAL INSECURITY: ARE YOU OR HAVE YOU BEEN THREATENED OR ABUSED PHYSICALLY, EMOTIONALLY, OR SEXUALLY BY ANYONE?: NO

## 2024-11-22 SDOH — SOCIAL STABILITY: SOCIAL INSECURITY: ARE THERE ANY APPARENT SIGNS OF INJURIES/BEHAVIORS THAT COULD BE RELATED TO ABUSE/NEGLECT?: NO

## 2024-11-22 SDOH — SOCIAL STABILITY: SOCIAL INSECURITY: HAVE YOU HAD ANY THOUGHTS OF HARMING ANYONE ELSE?: NO

## 2024-11-22 SDOH — SOCIAL STABILITY: SOCIAL INSECURITY: DOES ANYONE TRY TO KEEP YOU FROM HAVING/CONTACTING OTHER FRIENDS OR DOING THINGS OUTSIDE YOUR HOME?: NO

## 2024-11-22 SDOH — SOCIAL STABILITY: SOCIAL INSECURITY: WERE YOU ABLE TO COMPLETE ALL THE BEHAVIORAL HEALTH SCREENINGS?: YES

## 2024-11-22 ASSESSMENT — COGNITIVE AND FUNCTIONAL STATUS - GENERAL
DAILY ACTIVITIY SCORE: 24
MOBILITY SCORE: 24
MOBILITY SCORE: 24
DAILY ACTIVITIY SCORE: 24
PATIENT BASELINE BEDBOUND: NO

## 2024-11-22 ASSESSMENT — PAIN SCALES - GENERAL
PAINLEVEL_OUTOF10: 0 - NO PAIN
PAIN_LEVEL: 0
PAINLEVEL_OUTOF10: 0 - NO PAIN
PAINLEVEL_OUTOF10: 3
PAINLEVEL_OUTOF10: 0 - NO PAIN

## 2024-11-22 ASSESSMENT — PAIN - FUNCTIONAL ASSESSMENT
PAIN_FUNCTIONAL_ASSESSMENT: 0-10

## 2024-11-22 ASSESSMENT — PATIENT HEALTH QUESTIONNAIRE - PHQ9
2. FEELING DOWN, DEPRESSED OR HOPELESS: NOT AT ALL
SUM OF ALL RESPONSES TO PHQ9 QUESTIONS 1 & 2: 0
1. LITTLE INTEREST OR PLEASURE IN DOING THINGS: NOT AT ALL

## 2024-11-22 ASSESSMENT — LIFESTYLE VARIABLES
SKIP TO QUESTIONS 9-10: 1
HOW MANY STANDARD DRINKS CONTAINING ALCOHOL DO YOU HAVE ON A TYPICAL DAY: PATIENT DOES NOT DRINK
AUDIT-C TOTAL SCORE: 0
HOW OFTEN DO YOU HAVE 6 OR MORE DRINKS ON ONE OCCASION: NEVER
HOW OFTEN DO YOU HAVE A DRINK CONTAINING ALCOHOL: NEVER
AUDIT-C TOTAL SCORE: 0
PRESCIPTION_ABUSE_PAST_12_MONTHS: NO
SUBSTANCE_ABUSE_PAST_12_MONTHS: NO

## 2024-11-22 ASSESSMENT — ACTIVITIES OF DAILY LIVING (ADL)
WALKS IN HOME: INDEPENDENT
BATHING: INDEPENDENT
TOILETING: INDEPENDENT
ADEQUATE_TO_COMPLETE_ADL: YES
GROOMING: INDEPENDENT
FEEDING YOURSELF: INDEPENDENT
DRESSING YOURSELF: INDEPENDENT
PATIENT'S MEMORY ADEQUATE TO SAFELY COMPLETE DAILY ACTIVITIES?: YES
JUDGMENT_ADEQUATE_SAFELY_COMPLETE_DAILY_ACTIVITIES: YES
HEARING - LEFT EAR: FUNCTIONAL
LACK_OF_TRANSPORTATION: NO
HEARING - RIGHT EAR: FUNCTIONAL
LACK_OF_TRANSPORTATION: NO

## 2024-11-22 ASSESSMENT — COLUMBIA-SUICIDE SEVERITY RATING SCALE - C-SSRS
6. HAVE YOU EVER DONE ANYTHING, STARTED TO DO ANYTHING, OR PREPARED TO DO ANYTHING TO END YOUR LIFE?: NO
1. IN THE PAST MONTH, HAVE YOU WISHED YOU WERE DEAD OR WISHED YOU COULD GO TO SLEEP AND NOT WAKE UP?: NO
2. HAVE YOU ACTUALLY HAD ANY THOUGHTS OF KILLING YOURSELF?: NO

## 2024-11-22 NOTE — HOSPITAL COURSE
54 yr old female with hyperparathyroidism who underwent left parathyroidectomy on 11/22 with Dr Sanchez.  Post-op course uncomplicated.  Post-op Ca levels 9.1 and 8.1. PM recheck with calcium of 8.6. DC home POD #1 with 1250 TID [place].

## 2024-11-22 NOTE — SIGNIFICANT EVENT
Patient seen in PACU about 7 hours after left parathyroidectomy for primary hyperparathyroidism.  Of note patient has history of DVT/PE.  She has been watched overnight for calcium checks, as well as ensuring adherence to SCDs given her high clotting risk.    She is doing well.  Her throat is sore but she denies any overt voice changes.  She has been able to drink liquids, and just ate dinner.  Pain is well-controlled.  Denies chest pain or shortness of breath.  Denies perioral numbness or tingling.    Vitals:    11/22/24 1700   BP:    Pulse: 83   Resp: 15   Temp: 36.3 °C (97.3 °F)   SpO2: 95%     General Awake alert no acute distress sitting upright in bed in PACU  HEENT neck supple, no hematoma, minimal tenderness near incision.  No voice changes or stridor.,  Negative Chvostek's sign  Abdomen soft nontender nondistended  Extremities warm and well-perfused, SCDs in place, and running    Just had 6-hour calcium drawn, will follow-up    Patient doing well postoperatively, continue current plan of care.    Strict SCDs when not ambulating  Patient is on aspirin 81 twice daily which we are continuing which places her at higher risk for cervical hematoma  Anticipate discharge in a.m. pending clinical course and calcium balance    Johan Cullen MD   General Surgery resident  [facility] [id number]

## 2024-11-22 NOTE — ANESTHESIA POSTPROCEDURE EVALUATION
Patient: Jolly Alvarado    Procedure Summary       Date: 11/22/24 Room / Location: [facility] OR 04 / [facility] [place] [facility] [place]    Anesthesia Start: 0722 Anesthesia Stop: 0921    Procedure: Parathyroidectomy (Left: Neck) Diagnosis:       Primary hyperparathyroidism (Multi)      (Primary hyperparathyroidism (Multi) [E21.0])    Surgeons: Ellis Sanchez MD Responsible Provider: Audra Mora MD    Anesthesia Type: general ASA Status: 3            Anesthesia Type: general    Vitals Value Taken Time   /78 11/22/24 0921   Temp 36.4 11/22/24 0921   Pulse 90 11/22/24 0921   Resp 12 11/22/24 0921   SpO2 98 11/22/24 0921       Anesthesia Post Evaluation    Patient location during evaluation: PACU  Patient participation: complete - patient participated  Level of consciousness: awake  Pain score: 0  Pain management: adequate  Multimodal analgesia pain management approach  Airway patency: patent  Two or more strategies used to mitigate risk of obstructive sleep apnea  Cardiovascular status: acceptable and hemodynamically stable  Respiratory status: acceptable  Hydration status: acceptable  Postoperative Nausea and Vomiting: none        No notable events documented.

## 2024-11-22 NOTE — OP NOTE
Parathyroidectomy (L) Operative Note     Date: 2024  OR Location: [facility] OR    Name: Jolly Alvarado, : 1970, Age: 54 y.o., MRN: [id number], Sex: female    Diagnosis  Pre-op Diagnosis      * Primary hyperparathyroidism (Multi) [E21.0] Post-op Diagnosis     * Primary hyperparathyroidism (Multi) [E21.0]     Procedures  Parathyroidectomy  50780 - PA PARATHYROIDECTOMY/EXPLORATION PARATHYROIDS      Surgeons      * Ellis Sanchez - Primary    Resident/Fellow/Other Assistant:  Surgeons and Role:     * Johan Cullen MD - Resident - Assisting    Staff:   Circulator: Stephanie  Scrub Person: Lobar  Relief Scrub: Génesis  Relief Scrub: Becca    Anesthesia Staff: Anesthesiologist: Audra Mora MD  Anesthesia Resident: Elsa Schilling MD    Procedure Summary  Anesthesia: General  ASA: III  Estimated Blood Loss: 3 mL  Intra-op Medications:   Administrations occurring from 0645 to 1000 on 24:   Medication Name Total Dose   BUPivacaine HCl (Marcaine) 0.5 % (5 mg/mL) injection 6 mL   lactated Ringer's infusion Cannot be calculated   oxygen (O2) therapy 20 L   dexAMETHasone (Decadron) 4 mg/mL 4 mg   dexmedeTOMIDine (Precedex) bolus from bag 12 mcg   fentaNYL (Sublimaze) injection 50 mcg/mL 150 mcg   LR bolus Cannot be calculated   lidocaine (Xylocaine) injection 2 % 60 mg   magnesium sulfate IV 2 g/50 mL water (premix) 2 g   midazolam PF (Versed) injection 1 mg/mL 1 mg   ondansetron 2 mg/mL 4 mg   phenylephrine 40 mcg/mL syringe 10 mL 120 mcg   propofol (Diprivan) injection 10 mg/mL 140 mg   rocuronium (ZeMuron) 50 mg/5 mL injection 80 mg   sugammadex (Bridion) 200 mg/2 mL injection 200 mg              Anesthesia Record               Intraprocedure I/O Totals          Intake    Magnesium Sulfate 0.00 mL    The total shown is the total volume documented since Anesthesia Start was filed.    Dexmedetomidine 0.00 mL    The total shown is the total volume documented since Anesthesia Start was filed.    LR bolus  400.00 mL    Total Intake 400 mL       Output    Est. Blood Loss 5 mL    Total Output 5 mL       Net    Net Volume 395 mL          Specimen:   ID Type Source Tests Collected by Time   1 : LEFT INFERIOR PARATHYROID Tissue PARATHYROID GLAND RESECTION LEFT INFERIOR SURGICAL PATHOLOGY EXAM Ellis Sanchez MD 11/22/2024 0817   A : PTHN #2 Blood Blood, Venous PTH, INTACT Ellis Sanchez MD 11/22/2024 0809   B :  Blood Blood, Venous PTH, INTACT Ellis Sanchez MD 11/22/2024 0821   C : PTH #4 Blood Blood, Venous INTRAOPERATIVE PTH Ellis Sanchez MD 11/22/2024 0829                 Drains and/or Catheters: * None in log *    Tourniquet Times:         Implants:     Findings: Left inferior parathyroid adenoma approximately 300 mg    Indications: Jolly Alvarado is an 54 y.o. female who is having surgery for Primary hyperparathyroidism (Multi) [E21.0].  Patient has primary hyperparathyroidism complicated by multiple kidney stones.  Her sestamibi scan and ultrasound are both highly consistent with a left inferior parathyroid adenoma.  Risk benefits of surgery were discussed she agreed and signed consent.    The patient was seen in the preoperative area. The risks, benefits, complications, treatment options, non-operative alternatives, expected recovery and outcomes were discussed with the patient. The possibilities of reaction to medication, pulmonary aspiration, injury to surrounding structures, bleeding, recurrent infection, the need for additional procedures, failure to diagnose a condition, and creating a complication requiring transfusion or operation were discussed with the patient. The patient concurred with the proposed plan, giving informed consent.  The site of surgery was properly noted/marked if necessary per policy. The patient has been actively warmed in preoperative area. Preoperative antibiotics are not indicated. Venous thrombosis prophylaxis have been ordered including bilateral sequential compression  devices    Procedure Details: On the operative date patient's brought to the operating room.  After induction anesthetic she is prepped and draped in usual sterile fashion with a towel roll behind the shoulders and the neck gently extended.  Her baseline parathyroid hormone level from the preoperative area was 128.8.  We made a 3 cm cervical collar incision.  Divided down through platysma.  We raised limited subplatysmal flaps in all directions and then  the strap muscles in the midline.  We retracted the left-sided strap muscles out laterally.  We dissected down off the tip of the left thyroid lobe.  As seen on ultrasound in that location was an enlarged left inferior parathyroid adenoma.  We dissected circumferentially around the gland leaving it intact in its vascular pedicle.  We then sent a stimulated PTH value that went up to 377.4.  That was drawn from the internal jugular vein.  We then ligated the blood supply to the gland and removed an approximately 300 mg left inferior parathyroid adenoma.  We then sent 5 and 10-minute post excision values of parathyroid hormone.  These dropped to 51 and 39.8 respectively demonstrating good evidence of cure.  We irrigated the neck out well and achieved hemostasis.  Overall hemostasis was good.  We then closed traps and platysma with 3-0 Vicryl skin with 4-0 Monocryl half percent Marcaine was used local anesthetic and dry sterile dressings were applied.  Complications:  None; patient tolerated the procedure well.    Disposition: PACU - hemodynamically stable.  Condition: stable                 Additional Details:     Attending Attestation: I was present and scrubbed for the entire procedure.    Ellis Sanchez  Phone Number: [phone]

## 2024-11-22 NOTE — ANESTHESIA PROCEDURE NOTES
Airway  Date/Time: 11/22/2024 7:31 AM  Urgency: elective    Airway not difficult    Staffing  Performed: resident   Authorized by: Audra Mora MD    Performed by: Elsa Schilling MD  Patient location during procedure: OR    Indications and Patient Condition  Indications for airway management: anesthesia  Spontaneous Ventilation: absent  Sedation level: deep  Preoxygenated: yes  Patient position: sniffing  Mask difficulty assessment: 1 - vent by mask  Planned trial extubation    Final Airway Details  Final airway type: endotracheal airway      Successful airway: ETT     Successful intubation technique: video laryngoscopy  Facilitating devices/methods: intubating stylet and anterior pressure/BURP  Endotracheal tube insertion site: oral  Blade: Sriram  Blade size: #3  ETT size (mm): 7.0  Cormack-Lehane Classification: grade I - full view of glottis  Placement verified by: chest auscultation and capnometry   Measured from: lips  ETT to lips (cm): 22  Number of attempts at approach: 1

## 2024-11-22 NOTE — BRIEF OP NOTE
Date: 2024  OR Location: [facility] OR    Name: Jolly Alvarado, : 1970, Age: 54 y.o., MRN: [id number], Sex: female    Diagnosis  Pre-op Diagnosis      * Primary hyperparathyroidism (Multi) [E21.0] Post-op Diagnosis     * Primary hyperparathyroidism (Multi) [E21.0]     Procedures  Parathyroidectomy  78844 - PA PARATHYROIDECTOMY/EXPLORATION PARATHYROIDS  Left Parathyroidectomy       Surgeons      * Ellis Sanchez - Primary    Resident/Fellow/Other Assistant:  Surgeons and Role:     * Johan Cullen MD - Resident - Assisting    Staff:   Circulator: Stephanie  Scrub Person: Lobar  Relief Scrub: Génesis  Relief Scrub: Becca    Anesthesia Staff: Anesthesiologist: Audra Mora MD  Anesthesia Resident: Elsa Schilling MD    Procedure Summary  Anesthesia: General  ASA: III  Estimated Blood Loss: 5mL  Intra-op Medications:   Administrations occurring from 0645 to 1000 on 24:   Medication Name Total Dose   BUPivacaine HCl (Marcaine) 0.5 % (5 mg/mL) injection 6 mL   dexAMETHasone (Decadron) 4 mg/mL 4 mg   dexmedeTOMIDine (Precedex) bolus from bag 12 mcg   fentaNYL (Sublimaze) injection 50 mcg/mL 150 mcg   LR bolus Cannot be calculated   lidocaine (Xylocaine) injection 2 % 60 mg   magnesium sulfate IV 2 g/50 mL water (premix) 2 g   midazolam PF (Versed) injection 1 mg/mL 1 mg   ondansetron 2 mg/mL 4 mg   phenylephrine 40 mcg/mL syringe 10 mL 120 mcg   propofol (Diprivan) injection 10 mg/mL 140 mg   rocuronium (ZeMuron) 50 mg/5 mL injection 80 mg   sugammadex (Bridion) 200 mg/2 mL injection 200 mg              Anesthesia Record               Intraprocedure I/O Totals          Intake    Magnesium Sulfate 0.00 mL    The total shown is the total volume documented since Anesthesia Start was filed.    Dexmedetomidine 0.00 mL    The total shown is the total volume documented since Anesthesia Start was filed.    LR bolus 400.00 mL    Total Intake 400 mL       Output    Est. Blood Loss 5 mL    Total Output 5 mL        Net    Net Volume 395 mL          Specimen:   ID Type Source Tests Collected by Time   1 : LEFT INFERIOR PARATHYROID Tissue PARATHYROID GLAND RESECTION LEFT INFERIOR SURGICAL PATHOLOGY EXAM Ellis Sanchez MD 11/22/2024 0817   A : PTHN #2 Blood Blood, Venous PTH, INTACT Ellis Sanchez MD 11/22/2024 0809   B :  Blood Blood, Venous PTH, INTACT Ellis Sanchez MD 11/22/2024 0821   C : PTH #4 Blood Blood, Venous INTRAOPERATIVE PTH Ellis Sanchez MD 11/22/2024 0829                  Findings: Dual Localized Left Lower parathyroid adenoma identified in expected position. Good drop in PTH with removal.     Complications:  None; patient tolerated the procedure well.     Disposition: PACU - hemodynamically stable.  Condition: stable  Specimens Collected:   ID Type Source Tests Collected by Time   1 : LEFT INFERIOR PARATHYROID Tissue PARATHYROID GLAND RESECTION LEFT INFERIOR SURGICAL PATHOLOGY EXAM Ellis Sanchez MD 11/22/2024 0817   A : PTHN #2 Blood Blood, Venous PTH, INTACT Ellis Sanchez MD 11/22/2024 0809   B :  Blood Blood, Venous PTH, INTACT Ellis Sanchez MD 11/22/2024 0821   C : PTH #4 Blood Blood, Venous INTRAOPERATIVE PTH Ellis Sanchez MD 11/22/2024 0829     Attending Attestation: I was present and scrubbed for the entire procedure.    Ellis Sanchez  Phone Number: [phone]

## 2024-11-22 NOTE — ANESTHESIA PREPROCEDURE EVALUATION
Patient: Jolly Alvarado    Procedure Information       Date/Time: 11/22/24 0645    Procedure: Parathyroidectomy (Left)    Location: [facility] OR 04 / Virtual [facility] OR    Surgeons: Ellis Sanchez MD          H/O kidney stones s/p lithotripsy with postop course c/b DVT/PE. W/up significant for primary hyperparathyroidism now presenting for parathyroidectomy.    Relevant Problems   Anesthesia (within normal limits)      Cardiac (within normal limits)      Pulmonary   (+) Bilateral pulmonary embolism (Multi)      Neuro (within normal limits)      GI (within normal limits)      /Renal   (+) Nephrolithiasis      Liver (within normal limits)      Endocrine   (+) Primary hyperparathyroidism (Multi)      Hematology   (+) Acute deep vein thrombosis (DVT) of left lower extremity (Multi)       Clinical information reviewed:   Tobacco  Allergies  Meds   Med Hx  Surg Hx   Fam Hx  Soc Hx        NPO Detail:  NPO/Void Status  Carbohydrate Drink Given Prior to Surgery? : N  Date of Last Liquid: 11/22/24  Time of Last Liquid: 0330 (sip of water with meds)  Date of Last Solid: 11/21/24  Time of Last Solid: 2300  Last Intake Type: Clear fluids         Physical Exam    Airway  Mallampati: III  TM distance: >3 FB  Neck ROM: full     Cardiovascular   Rhythm: regular  Rate: normal     Dental - normal exam     Pulmonary   Breath sounds clear to auscultation     Abdominal      Other findings: Teeth 8 and 9 capped          Anesthesia Plan    History of general anesthesia?: yes  History of complications of general anesthesia?: no    ASA 3     general     The patient is not a current smoker.    intravenous induction   Postoperative administration of opioids is intended.  Trial extubation is planned.  Anesthetic plan and risks discussed with patient.  Use of blood products discussed with patient who consented to blood products.    Plan discussed with resident.

## 2024-11-22 NOTE — CARE PLAN
Pt arrived to unit around 1745 s/p Left parathyroidectomy this morning. Pt is alert and oriented, complains of minimal pain, and is up independently. Here for DVT prevention/monitoring overnight d/t history of DVT/PE.     Problem: Pain - Adult  Goal: Verbalizes/displays adequate comfort level or baseline comfort level  Outcome: Progressing     Problem: Safety - Adult  Goal: Free from fall injury  Outcome: Progressing     Problem: Discharge Planning  Goal: Discharge to home or other facility with appropriate resources  Outcome: Progressing     Problem: Chronic Conditions and Co-morbidities  Goal: Patient's chronic conditions and co-morbidity symptoms are monitored and maintained or improved  Outcome: Progressing   The patient's goals for the shift include Get some  good rest tonight     The clinical goals for the shift include [place] patient to unit and plan of care

## 2024-11-22 NOTE — PROGRESS NOTES
"Pharmacy Medication History Review    Jolly Alvarado is a 54 y.o. female admitted for Primary hyperparathyroidism (Multi). Pharmacy reviewed the patient's yyvem-sa-soiivppry medications and allergies for accuracy.    Medications ADDED:  none  Medications CHANGED:  none  Medications REMOVED:   Aspirin 81mg     The list below reflects the updated PTA list.   Prior to Admission Medications   Prescriptions Last Dose Informant   Eliquis 5 mg tablet Not Taking Self   Sig: TAKE 1 TABLET BY MOUTH TWICE  DAILY   Patient not taking: Reported on 11/22/2024   apixaban (Eliquis) 5 mg tablet Past Week Self   Sig: Take 1 tablet (5 mg) by mouth 2 times a day.   chlorhexidine (Hibiclens) 4 % external liquid Not Taking    Sig: Apply 1 Application topically once daily for 5 doses. Use per CPM/PAT provided instructions   Patient not taking: Reported on 11/22/2024   chlorhexidine (Peridex) 0.12 % solution Not Taking    Sig: Use 15 mL in the mouth or throat once daily for 2 doses.   Patient not taking: Reported on 11/22/2024   cinacalcet (Sensipar) 30 mg tablet 11/21/2024 Self   Sig: Take 1 tablet (30 mg) by mouth once daily. Take with food or shortly afer a meal. Swallow tablet whole; do not break or divide.      Facility-Administered Medications: None          The list below reflects the updated allergy list. Please review each documented allergy for additional clarification and justification.  Allergies  Reviewed by Yashira White on 11/22/2024        Severity Reactions Comments    Levofloxacin Medium Nausea/vomiting             Patient declines M2B at discharge.     Sources:   -Patient interview, good historian   -Outpatient pharmacy dispense history  -OARRS      Additional Comments:  none      YASHIRA WHITE  Pharmacy Technician  11/22/24     Secure Chat preferred   If no response call [phone] or [employer] \"Med Rec\"    "

## 2024-11-23 VITALS
RESPIRATION RATE: 18 BRPM | TEMPERATURE: 98.8 F | HEIGHT: 66 IN | HEART RATE: 75 BPM | SYSTOLIC BLOOD PRESSURE: 128 MMHG | BODY MASS INDEX: 25.23 KG/M2 | WEIGHT: 156.97 LBS | DIASTOLIC BLOOD PRESSURE: 73 MMHG | OXYGEN SATURATION: 97 %

## 2024-11-23 LAB
ALBUMIN SERPL BCP-MCNC: 3.7 G/DL (ref 3.4–5)
ANION GAP SERPL CALC-SCNC: 14 MMOL/L (ref 10–20)
BUN SERPL-MCNC: 20 MG/DL (ref 6–23)
CALCIUM SERPL-MCNC: 8.1 MG/DL (ref 8.6–10.6)
CALCIUM SERPL-MCNC: 8.6 MG/DL (ref 8.6–10.6)
CHLORIDE SERPL-SCNC: 108 MMOL/L (ref 98–107)
CO2 SERPL-SCNC: 19 MMOL/L (ref 21–32)
CREAT SERPL-MCNC: 1.07 MG/DL (ref 0.5–1.05)
EGFRCR SERPLBLD CKD-EPI 2021: 62 ML/MIN/1.73M*2
GLUCOSE SERPL-MCNC: 104 MG/DL (ref 74–99)
PHOSPHATE SERPL-MCNC: 2.8 MG/DL (ref 2.5–4.9)
POTASSIUM SERPL-SCNC: 3.3 MMOL/L (ref 3.5–5.3)
SODIUM SERPL-SCNC: 138 MMOL/L (ref 136–145)

## 2024-11-23 PROCEDURE — 82310 ASSAY OF CALCIUM: CPT | Performed by: STUDENT IN AN ORGANIZED HEALTH CARE EDUCATION/TRAINING PROGRAM

## 2024-11-23 PROCEDURE — 80069 RENAL FUNCTION PANEL: CPT | Performed by: STUDENT IN AN ORGANIZED HEALTH CARE EDUCATION/TRAINING PROGRAM

## 2024-11-23 PROCEDURE — 36415 COLL VENOUS BLD VENIPUNCTURE: CPT | Performed by: STUDENT IN AN ORGANIZED HEALTH CARE EDUCATION/TRAINING PROGRAM

## 2024-11-23 PROCEDURE — 7100000011 HC EXTENDED STAY RECOVERY HOURLY - NURSING UNIT

## 2024-11-23 PROCEDURE — 2500000001 HC RX 250 WO HCPCS SELF ADMINISTERED DRUGS (ALT 637 FOR MEDICARE OP): Performed by: STUDENT IN AN ORGANIZED HEALTH CARE EDUCATION/TRAINING PROGRAM

## 2024-11-23 RX ORDER — CALCIUM CARBONATE 500(1250)
1 TABLET,CHEWABLE ORAL 3 TIMES DAILY
Qty: 90 TABLET | Refills: 0 | Status: SHIPPED | OUTPATIENT
Start: 2024-11-23 | End: 2024-12-23

## 2024-11-23 RX ADMIN — ASPIRIN 81 MG: 81 TABLET, COATED ORAL at 09:40

## 2024-11-23 RX ADMIN — CALCIUM 2500 MG: 500 TABLET ORAL at 09:40

## 2024-11-23 RX ADMIN — ACETAMINOPHEN 650 MG: 325 TABLET ORAL at 09:40

## 2024-11-23 ASSESSMENT — COGNITIVE AND FUNCTIONAL STATUS - GENERAL
MOBILITY SCORE: 24
DAILY ACTIVITIY SCORE: 24

## 2024-11-23 ASSESSMENT — PAIN SCALES - GENERAL
PAINLEVEL_OUTOF10: 0 - NO PAIN

## 2024-11-23 ASSESSMENT — PAIN - FUNCTIONAL ASSESSMENT
PAIN_FUNCTIONAL_ASSESSMENT: 0-10
PAIN_FUNCTIONAL_ASSESSMENT: 0-10

## 2024-11-23 NOTE — SIGNIFICANT EVENT
S:    POD 0 from parathyroidectomy. Resting comfortably in bed, has tolerated clear liquid diet well with no concerns and has ambulated to the bathroom. Denies nausea, vomiting, shortness of breath, vision changes, headaches, chest pain, abdominal pain, dysuria, diarrhea, constipation, numbness and tingling in the extremities.       O:   Vital signs are stable, normotensive, afebrile, no new or worsening oxygen requirement, not tachycardic  Visit Vitals  /74 (BP Location: Right arm, Patient Position: Lying)   Pulse 69   Temp 36.1 °C (97 °F) (Temporal)   Resp 18        Constitutional: no acute distress  Skin: warm and dry overall   Neuro: A/O x4, no gross deficits   HEENT: Atraumatic, no scleral icterus  Cardiac: RRR  Pulmonary: Unlabored respirations on room air  Abdomen: Non distended, non tender  GI: Voiding spontaneously  Surgical Site: Dressing clean dry and intact with minimal amounts of strikethrough, appropriately tender    A/P:  Overall, patient is doing well postoperatively with no acute concerns.  Will continue to optimize pain control as needed.  Will continue to monitor clinical exam, vitals, I&O's, and labs when available.  Will follow up on the patient in the a.m. or sooner as needed.

## 2024-11-23 NOTE — DISCHARGE SUMMARY
Discharge Diagnosis  Primary hyperparathyroidism (Multi)    Issues Requiring Follow-Up  Post Op Follow Up    Test Results Pending At Discharge  Pending Labs       Order Current Status    Surgical Pathology Exam In process            Hospital Course  54 yr old female with hyperparathyroidism who underwent left parathyroidectomy on 11/22 with Dr Sanchez.  Post-op course uncomplicated.  Post-op Ca levels 9.1 and 8.1. PM recheck with calcium of 8.6. DC home POD #1 with 1250 TID [place].  Will resume Eliquis 48 hours after surgery on Sunday.     Pertinent Physical Exam At Time of Discharge  General Awake alert no acute distress sitting upright in bed in PACU  HEENT neck supple, no hematoma, minimal tenderness near incision.  No voice changes or stridor.,  Negative Chvostek's sign  Abdomen soft nontender nondistended  Extremities warm and well-perfused, SCDs in place, and running    Home Medications     Medication List      START taking these medications     calcium carbonate 500 mg calcium (1,250 mg) chewable tablet; Chew 1   tablet (1,250 mg) 3 times a day.   traMADol 50 mg tablet; Commonly known as: Ultram; Take 1 tablet (50 mg)   by mouth every 6 hours if needed for severe pain (7 - 10).     CONTINUE taking these medications     * apixaban 5 mg tablet; Commonly known as: Eliquis; Take 1 tablet (5 mg)   by mouth 2 times a day.  * This list has 1 medication(s) that are the same as other medications   prescribed for you. Read the directions carefully, and ask your doctor or   other care provider to review them with you.     STOP taking these medications     chlorhexidine 0.12 % solution; Commonly known as: Peridex   chlorhexidine 4 % external liquid; Commonly known as: Hibiclens   cinacalcet 30 mg tablet; Commonly known as: Sensipar     ASK your doctor about these medications     * Eliquis 5 mg tablet; Generic drug: apixaban; TAKE 1 TABLET BY MOUTH   TWICE  DAILY  * This list has 1 medication(s) that are the same as other  medications   prescribed for you. Read the directions carefully, and ask your doctor or   other care provider to review them with you.       Outpatient Follow-Up  Future Appointments   Date Time Provider Department [place]   12/9/2024 11:45 AM Ellis Sanchez MD [id number] [facility]   2/10/2025 10:00 AM [facility] VASC 1 [id number] [facility]   2/10/2025 11:15 AM [facility]   2/17/2025  3:30 PM KIKI Amin-CNP, DNP [id number] [facility]   3/26/2025  3:30 PM CARLOTA Gomez, DNP [id number] [facility]       Johan Cullen MD

## 2024-11-23 NOTE — DISCHARGE INSTRUCTIONS
Resume Eliquis Sunday Evening 11/24/24. Stop taking Aspirin at that time. Avoid Motrin or other NSAID medications due to risk of bleeding.     You have been given calcium pills to take three times daily. If feel symptoms of hypocalcemia, which would be tingling around your mouth or numbness, take an extra calcium tablet and call the office.

## 2024-11-23 NOTE — PROGRESS NOTES
"[facility]  DEPARTMENT OF SURGERY    PROGRESS NOTE    SUBJECTIVE  Doing well this morning. Denies perioral numbness or tingling. Denies chest pain or shortness of breath.     OBJECTIVE  Vitals:  Temp:  [36 °C (96.8 °F)-37.1 °C (98.8 °F)] 37.1 °C (98.8 °F)  Heart Rate:  [66-83] 75  Resp:  [10-18] 18  BP: (112-151)/(57-78) 128/73    I/Os:  I/O last 3 completed shifts:  In: 537.5 (7.5 mL/kg) [I.V.:137.5 (1.9 mL/kg); IV Piggyback:400]  Out: 5 (0.1 mL/kg) [Blood:5]  Weight: 71.2 kg     Exam:  Awake alert no acute distress sitting upright in bed  Neck supple, no hematoma, minimal tenderness near incision.  No voice changes or stridor.,  Negative Chvostek's sign  Abdomen soft nontender nondistended  Extremities warm and well-perfused, SCDs in place, and running    Labs:  CBC: No results found for: \"WBC\", \"RBC\"  BMP:   Lab Results   Component Value Date    CALCIUM 8.1 (L) 11/23/2024     Coagulation: No results found for: \"INR\", \"APTT\"      ASSESSMENT:  54 yr old female with hyperparathyroidism who underwent left parathyroidectomy on 11/22 with Dr Sanchez.  Post-op course complicated by hypocalcemia.  Post-op Ca levels 9.1 and then 8.1 in am.      Oral Calcium repletion  Follow up in early PM   PO pain meds  Asa BID  Strict SCDs  Regular Diet  Discharge pending calcium    d/w Dr. Daniel Cullen MD  General Surgery Resident  [facility] [id number]            "

## 2024-12-09 ENCOUNTER — LAB (OUTPATIENT)
Dept: LAB | Facility: LAB | Age: 54
End: 2024-12-09
Payer: COMMERCIAL

## 2024-12-09 ENCOUNTER — APPOINTMENT (OUTPATIENT)
Dept: SURGERY | Facility: CLINIC | Age: 54
End: 2024-12-09

## 2024-12-09 VITALS
SYSTOLIC BLOOD PRESSURE: 161 MMHG | BODY MASS INDEX: 25.08 KG/M2 | HEART RATE: 84 BPM | DIASTOLIC BLOOD PRESSURE: 78 MMHG | WEIGHT: 155.4 LBS | TEMPERATURE: 98.3 F

## 2024-12-09 DIAGNOSIS — E89.2 S/P PARATHYROIDECTOMY (CMS/HCC): ICD-10-CM

## 2024-12-09 LAB
25(OH)D3 SERPL-MCNC: 37 NG/ML (ref 30–100)
CALCIUM SERPL-MCNC: 9.2 MG/DL (ref 8.6–10.3)

## 2024-12-09 PROCEDURE — 83970 ASSAY OF PARATHORMONE: CPT

## 2024-12-09 PROCEDURE — 99024 POSTOP FOLLOW-UP VISIT: CPT | Performed by: SURGERY

## 2024-12-09 PROCEDURE — 1036F TOBACCO NON-USER: CPT | Performed by: SURGERY

## 2024-12-09 PROCEDURE — 82306 VITAMIN D 25 HYDROXY: CPT

## 2024-12-09 PROCEDURE — 82310 ASSAY OF CALCIUM: CPT

## 2024-12-09 PROCEDURE — 36415 COLL VENOUS BLD VENIPUNCTURE: CPT

## 2024-12-09 ASSESSMENT — PAIN SCALES - GENERAL: PAINLEVEL_OUTOF10: 0-NO PAIN

## 2024-12-09 NOTE — PROGRESS NOTES
Subjective   Patient ID: Jolly Alvarado is a 54 y.o. female who presents for for parathyroidectomy..    HPI I saw Mrs. Alvarado back in surgery clinic today.  She is now just about 2 and half weeks status post minimally invasive parathyroidectomy with IntraOp parathyroid hormone monitoring.  At the time of her operation I removed a solitary left inferior parathyroid adenoma.  Her baseline parathyroid hormone level started out high at 129.  With removal of the adenoma her PTH normalized to 39.8.    We monitored her overnight.  She did well with no postop complications.  Her calcium level did go down from a preop value of 12.6 to about 8.1.  She was asymptomatic with her hypocalcemia.  She was started on oral calcium supplementation and her calcium was normal on the morning of discharge at 8.6.    At home she remains on some calcium supplementation currently.    Her final pathology is still pending.    In the office, she is doing well.  No neck pain no difficulty breathing or swallowing no troubles with her voice.  No signs or symptoms of low calcium.  She still taking Tums tablets, 2 pills twice daily for her asymptomatic hypocalcemia.    Review of Systems    Objective   Physical Exam  Vitals reviewed.   Constitutional:       Appearance: Normal appearance.      Comments: Her voice is normal   Neck:      Comments: Neck incision well-healed no seroma  Neurological:      Mental Status: She is alert.         Assessment/Plan    Mrs. Alvarado did extremely well with her minimally invasive parathyroidectomy for her primary hyperparathyroidism.  She did have some asymptomatic hypocalcemia.     She is currently taking some calcium supplementation.    I will send her to the lab today to check calcium PTH and vitamin D levels.  We can begin weaning her calcium supplementation based on the results.    I would like her to follow-up with me in about 6 months.    She can proceed forward with her neurologist for her kidney stone surgery  whenever they feel it is appropriate.    Ellis Sanchez MD 12/09/24 11:57 AM

## 2024-12-10 LAB — PTH-INTACT SERPL-MCNC: 33.3 PG/ML (ref 18.5–88)

## 2024-12-17 LAB
LABORATORY COMMENT REPORT: NORMAL
PATH REPORT.FINAL DX SPEC: NORMAL
PATH REPORT.GROSS SPEC: NORMAL
PATH REPORT.RELEVANT HX SPEC: NORMAL
PATH REPORT.TOTAL CANCER: NORMAL

## 2025-02-04 ENCOUNTER — TELEPHONE (OUTPATIENT)
Dept: CARDIOLOGY | Facility: CLINIC | Age: 55
End: 2025-02-04
Payer: COMMERCIAL

## 2025-02-04 NOTE — TELEPHONE ENCOUNTER
Received a call from [facility] CT lab to clarify order for CT angio chest if would like an aorta study or PE angio instead. If PE angio, could you please place a new order? Thank you.

## 2025-02-06 DIAGNOSIS — Z86.711 HISTORY OF PULMONARY EMBOLISM: Primary | ICD-10-CM

## 2025-02-10 ENCOUNTER — HOSPITAL ENCOUNTER (OUTPATIENT)
Dept: VASCULAR MEDICINE | Facility: HOSPITAL | Age: 55
Discharge: HOME | End: 2025-02-10
Payer: COMMERCIAL

## 2025-02-10 ENCOUNTER — HOSPITAL ENCOUNTER (OUTPATIENT)
Dept: RADIOLOGY | Facility: HOSPITAL | Age: 55
Discharge: HOME | End: 2025-02-10
Payer: COMMERCIAL

## 2025-02-10 DIAGNOSIS — I82.592 CHRONIC EMBOLISM AND THROMBOSIS OF OTHER SPECIFIED DEEP VEIN OF LEFT LOWER EXTREMITY (MULTI): ICD-10-CM

## 2025-02-10 DIAGNOSIS — Z86.711 HISTORY OF PULMONARY EMBOLISM: ICD-10-CM

## 2025-02-10 DIAGNOSIS — I82.402 ACUTE DEEP VEIN THROMBOSIS (DVT) OF LEFT LOWER EXTREMITY, UNSPECIFIED VEIN (MULTI): ICD-10-CM

## 2025-02-10 PROCEDURE — 71275 CT ANGIOGRAPHY CHEST: CPT

## 2025-02-10 PROCEDURE — 71275 CT ANGIOGRAPHY CHEST: CPT | Performed by: RADIOLOGY

## 2025-02-10 PROCEDURE — 93971 EXTREMITY STUDY: CPT | Performed by: SURGERY

## 2025-02-10 PROCEDURE — 93971 EXTREMITY STUDY: CPT

## 2025-02-10 PROCEDURE — 2550000001 HC RX 255 CONTRASTS

## 2025-02-10 RX ADMIN — IOHEXOL 73 ML: 350 INJECTION, SOLUTION INTRAVENOUS at 11:40

## 2025-02-17 ENCOUNTER — APPOINTMENT (OUTPATIENT)
Dept: CARDIOLOGY | Facility: CLINIC | Age: 55
End: 2025-02-17
Payer: COMMERCIAL

## 2025-02-17 NOTE — PROGRESS NOTES
CHIEF COMPLAINT  Follow up and test results    HISTORY OF PRESENT ILLNESS    Patient presents for follow up and test results after previous DVT and PE (). Repeat CTA of the chest () showing no evidence of PE. Follow up venous duplex () showing chronic DVT to the distal femoral, popliteal, and PT veins of RLE and chronic changes to distal femoral, popliteal, and PTV of LLE with previous results showing acute DVT.       Cardiovascular hx:    DVT/PE ():  - Provoking factors: s/p renal surgery (with leg pain occurring 1 day after surgery), recent prolonged travel to [place] and reported worsening leg pain, and hormonal injections for menstrual cycle with last injection approximately 1 week prior to hospital admission   -On Eliquis 5mg twice daily with no overt bleeding             Cardiovascular testin.Echo ()-preserved biventricular function         Past Medical, Surgical, and Family History reviewed and updated in chart.     Reviewed all medications by prescribing practitioner or clinical pharmacist (such as prescriptions, OTCs, herbal therapies and supplements) and documented in the medical record.    Past Medical History  Past Medical History:   Diagnosis Date    DVT (deep vein thrombosis) in pregnancy ([facility])     Kidney stones     PE (pulmonary thromboembolism) (Multi)        Social History  Social History     Tobacco Use    Smoking status: Former     Types: Cigarettes     Passive exposure: Past    Smokeless tobacco: Never   Vaping Use    Vaping status: Never Used   Substance Use Topics    Alcohol use: Never    Drug use: Never       Family History     Family History   Problem Relation Name Age of Onset    No Known Problems Mother      Nephrolithiasis Father          Allergies:  Allergies   Allergen Reactions    Levofloxacin Nausea/vomiting        Outpatient Medications:  Current Outpatient Medications   Medication Instructions    apixaban  "(ELIQUIS) 5 mg, oral, 2 times daily    traMADol (ULTRAM) 50 mg, oral, Every 6 hours PRN          Labs:   CMP:  Recent Labs     11/23/24  1321 11/14/24  1016 09/23/24  1015 08/04/24  0807 08/03/24  0414 07/31/24  0447 07/30/24  1846    139 139 136 136   < > 135*   K 3.3* 5.2 4.2 4.0 4.1   < > 3.9   * 107 109* 109* 110*   < > 109*   CO2 19* 23 24 21 20*   < > 20*   ANIONGAP 14 14 10 10 10   < > 10   BUN 20 15 19 12 15   < > 16   CREATININE 1.07* 1.04 1.08* 0.86 0.94   < > 0.92   EGFR 62 64 62 81 73   < > 75   MG  --   --   --   --   --   --  1.94    < > = values in this interval not displayed.     Recent Labs     11/23/24  1321 07/31/24  0447 07/30/24  1846 07/30/24  1432   ALBUMIN 3.7 3.2* 3.5 3.7   ALKPHOS  --  78 89 88   ALT  --  8 10 11   AST  --  10 11 12   BILITOT  --  0.3 0.4 0.4     CBC:  Recent Labs     11/14/24  1016 08/03/24  0414 08/02/24  0429 08/01/24  0518 07/31/24  0447   WBC 6.4 7.2 6.5 6.2 7.4   HGB 14.8 12.1 11.2* 10.5* 10.4*   HCT 45.8 37.7 34.9* 33.4* 33.0*    300 256 214 205   MCV 91 92 93 95 94     COAG:   Recent Labs     08/01/24  0518 07/31/24  0447 07/31/24  0101 07/30/24  2102 07/30/24  1432   INR  --   --   --   --  1.3*   HAUF 0.5 0.6 0.7   < >  --     < > = values in this interval not displayed.     ABO:   Recent Labs     11/22/24  0717   ABO A     HEME/ENDO:  Recent Labs     08/03/24  0935 07/30/24  1846   TSH 3.41  --    HGBA1C  --  5.1      CARDIAC:   Recent Labs     07/30/24  1719   TROPHS 9   BNP 31   No results for input(s): \"CHOL\", \"LDLF\", \"HDL\", \"TRIG\" in the last 28920 hours.  MICRO: No results for input(s): \"ESR\", \"CRP\", \"PROCAL\" in the last 41960 hours.  No results found for the last 90 days.    Notable Studies: imaging personally reviewed   EKG:No results found for this or any previous visit (from the past 4464 hours).  Echocardiogram: No results found for this or any previous visit from the past 1825 days.    Stress Testing: No results found for this or any " previous visit from the past 1825 days.    Cardiac Catheterization: No results found for this or any previous visit from the past 1825 days.  No results found for this or any previous visit from the past 3650 days.         REVIEW OF SYSTEMS  A 10-point system review was completed and was negative except as noted in the HPI.      VITALS  Vitals:    02/19/25 1510   BP: 118/64   Pulse: 68   SpO2: 99%       PHYSICAL EXAM  General: awake, alert and oriented. No acute distress.   Extremities: mild swelling to LLE; no wounds; no pain   Neurological: no focal deficits; gait steady  Psychiatric: appropriate mood and affect; good judgment and insight          ASSESSMENT AND PLAN  Assessment/Plan   Diagnoses and all orders for this visit:  Encounter to discuss test results  Chronic deep vein thrombosis (DVT) of both lower extremities, unspecified vein (Multi)  History of pulmonary embolism  -We reviewed recent venous duplex and CTA results showing resolution of PE and acute DVT is now chronic to lower extremities. I recommend patient continue DOAC for another 6 months and then will order follow up venous duplex. No overt bleeding.  -Discussed post-thrombotic syndrome and provided tubi- to patient to help with lower extremity edema. Patient advised to elevated legs above the level of the heart to help with swelling        RTC: 1 year      Thank you for allowing me to participate in the care of this patient. Please reach me out if you have any questions or if you need any clarifications regarding the patient's care.    Kay Ortiz, STEPHANIE, APRN, FNP-C  Division of Cardiovascular Medicine  [facility] [place]  [facility]

## 2025-02-19 ENCOUNTER — APPOINTMENT (OUTPATIENT)
Dept: CARDIOLOGY | Facility: CLINIC | Age: 55
End: 2025-02-19
Payer: COMMERCIAL

## 2025-02-19 VITALS
HEIGHT: 68 IN | BODY MASS INDEX: 24.51 KG/M2 | WEIGHT: 161.7 LBS | DIASTOLIC BLOOD PRESSURE: 64 MMHG | HEART RATE: 68 BPM | OXYGEN SATURATION: 99 % | SYSTOLIC BLOOD PRESSURE: 118 MMHG

## 2025-02-19 DIAGNOSIS — Z86.711 HISTORY OF PULMONARY EMBOLISM: ICD-10-CM

## 2025-02-19 DIAGNOSIS — Z71.2 ENCOUNTER TO DISCUSS TEST RESULTS: ICD-10-CM

## 2025-02-19 DIAGNOSIS — I82.503 CHRONIC DEEP VEIN THROMBOSIS (DVT) OF BOTH LOWER EXTREMITIES, UNSPECIFIED VEIN (MULTI): Primary | ICD-10-CM

## 2025-02-19 PROBLEM — Z86.718 HISTORY OF VENOUS THROMBOEMBOLISM: Status: ACTIVE | Noted: 2025-02-19

## 2025-02-19 PROBLEM — I82.402 ACUTE DEEP VEIN THROMBOSIS (DVT) OF LEFT LOWER EXTREMITY (MULTI): Status: RESOLVED | Noted: 2024-08-14 | Resolved: 2025-02-19

## 2025-02-19 PROBLEM — I26.99 BILATERAL PULMONARY EMBOLISM (MULTI): Status: RESOLVED | Noted: 2024-07-30 | Resolved: 2025-02-19

## 2025-02-19 PROCEDURE — 99214 OFFICE O/P EST MOD 30 MIN: CPT

## 2025-02-19 PROCEDURE — 3008F BODY MASS INDEX DOCD: CPT

## 2025-02-19 PROCEDURE — 1036F TOBACCO NON-USER: CPT

## 2025-03-22 LAB
ANION GAP SERPL CALCULATED.4IONS-SCNC: 9 MMOL/L (CALC) (ref 7–17)
BUN SERPL-MCNC: 19 MG/DL (ref 7–25)
BUN/CREAT SERPL: 17 (CALC) (ref 6–22)
CALCIUM SERPL-MCNC: 9.5 MG/DL (ref 8.6–10.4)
CHLORIDE SERPL-SCNC: 110 MMOL/L (ref 98–110)
CO2 SERPL-SCNC: 21 MMOL/L (ref 20–32)
CREAT SERPL-MCNC: 1.15 MG/DL (ref 0.5–1.03)
EGFRCR SERPLBLD CKD-EPI 2021: 57 ML/MIN/1.73M2
GLUCOSE SERPL-MCNC: 87 MG/DL (ref 65–99)
POTASSIUM SERPL-SCNC: 4 MMOL/L (ref 3.5–5.3)
SODIUM SERPL-SCNC: 140 MMOL/L (ref 135–146)

## 2025-03-26 ENCOUNTER — APPOINTMENT (OUTPATIENT)
Dept: NEPHROLOGY | Facility: CLINIC | Age: 55
End: 2025-03-26
Payer: COMMERCIAL

## 2025-03-26 VITALS
HEART RATE: 61 BPM | DIASTOLIC BLOOD PRESSURE: 72 MMHG | HEIGHT: 68 IN | WEIGHT: 163 LBS | SYSTOLIC BLOOD PRESSURE: 144 MMHG | BODY MASS INDEX: 24.71 KG/M2

## 2025-03-26 DIAGNOSIS — N20.0 NEPHROLITHIASIS: Primary | ICD-10-CM

## 2025-03-26 DIAGNOSIS — E89.2 S/P PARATHYROIDECTOMY (CMS/HCC): ICD-10-CM

## 2025-03-26 DIAGNOSIS — E21.3 HYPERPARATHYROIDISM (MULTI): ICD-10-CM

## 2025-03-26 DIAGNOSIS — E21.0 PRIMARY HYPERPARATHYROIDISM (MULTI): ICD-10-CM

## 2025-03-26 PROCEDURE — 3008F BODY MASS INDEX DOCD: CPT | Performed by: CLINICAL NURSE SPECIALIST

## 2025-03-26 PROCEDURE — 99213 OFFICE O/P EST LOW 20 MIN: CPT | Performed by: CLINICAL NURSE SPECIALIST

## 2025-03-26 PROCEDURE — 1036F TOBACCO NON-USER: CPT | Performed by: CLINICAL NURSE SPECIALIST

## 2025-03-26 ASSESSMENT — ENCOUNTER SYMPTOMS
CONSTITUTIONAL NEGATIVE: 1
ENDOCRINE NEGATIVE: 1
CARDIOVASCULAR NEGATIVE: 1
RESPIRATORY NEGATIVE: 1
GASTROINTESTINAL NEGATIVE: 1
NEUROLOGICAL NEGATIVE: 1
PSYCHIATRIC NEGATIVE: 1
MUSCULOSKELETAL NEGATIVE: 1

## 2025-03-26 NOTE — PROGRESS NOTES
Subjective   Patient ID: Jolly Alvarado is a 54 y.o. female who presents for Follow-up (6 months/Review labs ).  Patient being seen in follow-up secondary to hyperparathyroidism and hypercalcemia with recent parathyroidectomy in November    Labs reviewed  Glucose 87  Renal functions BUN of 19 creatinine of 1.15, GFR is 57  Sodium 140, potassium 4.0, chloride 110, bicarb 21  Calcium 9.5        Review of Systems   Constitutional: Negative.    Respiratory: Negative.     Cardiovascular: Negative.    Gastrointestinal: Negative.    Endocrine: Negative.    Genitourinary: Negative.    Musculoskeletal: Negative.    Skin: Negative.    Neurological: Negative.    Psychiatric/Behavioral: Negative.         Objective   Physical Exam  Vitals reviewed.   Constitutional:       Appearance: Normal appearance.   HENT:      Head: Normocephalic.   Cardiovascular:      Rate and Rhythm: Normal rate and regular rhythm.   Pulmonary:      Effort: Pulmonary effort is normal.      Breath sounds: Normal breath sounds.   Abdominal:      Palpations: Abdomen is soft.   Musculoskeletal:         General: Normal range of motion.   Skin:     General: Skin is warm and dry.   Neurological:      Mental Status: She is alert and oriented to person, place, and time.   Psychiatric:         Mood and Affect: Mood normal.         Behavior: Behavior normal.       Assessment/Plan   Problem List Items Addressed This Visit             ICD-10-CM    Primary hyperparathyroidism (Multi) E21.0    Nephrolithiasis - Primary N20.0    Hyperparathyroidism (Multi) E21.3    Relevant Orders    Basic metabolic panel    Follow Up In Nephrology    S/P parathyroidectomy (CMS/[facility]) E89.2     Primary hyperparathyroidism with parathyroidectomy November 22, 2024  Recurrent nephrolithiasis  DVT with PE after surgical intervention  Possible underlying CKD     She is feeling much better status post parathyroidectomy, calcium levels within normal limits  She is having some hematuria at times  and also some discomfort on the right flank area secondary to possible nephrolithiasis, will follow-up with cardiology and see if we can hold her Eliquis prior to having lithotripsy, I will then call her urologist and let them know if this is okay  Will plan on follow-up in our office in 9 months as she will be seeing Dr. Sanchez in 3 months.  KIKI Gomez-MARK, DNP 03/26/25 3:09 PM

## 2025-04-30 DIAGNOSIS — I82.4Y2 ACUTE DEEP VEIN THROMBOSIS (DVT) OF PROXIMAL VEIN OF LEFT LOWER EXTREMITY: ICD-10-CM

## 2025-06-09 ENCOUNTER — APPOINTMENT (OUTPATIENT)
Dept: SURGERY | Facility: CLINIC | Age: 55
End: 2025-06-09
Payer: COMMERCIAL

## 2025-08-05 DIAGNOSIS — Z12.31 ENCOUNTER FOR SCREENING MAMMOGRAM FOR BREAST CANCER: ICD-10-CM

## 2025-08-13 ENCOUNTER — HOSPITAL ENCOUNTER (OUTPATIENT)
Dept: VASCULAR MEDICINE | Facility: HOSPITAL | Age: 55
Discharge: HOME | End: 2025-08-13
Payer: COMMERCIAL

## 2025-08-13 DIAGNOSIS — I82.503 CHRONIC DEEP VEIN THROMBOSIS (DVT) OF BOTH LOWER EXTREMITIES, UNSPECIFIED VEIN: ICD-10-CM

## 2025-08-13 DIAGNOSIS — I82.592 CHRONIC EMBOLISM AND THROMBOSIS OF OTHER SPECIFIED DEEP VEIN OF LEFT LOWER EXTREMITY: ICD-10-CM

## 2025-08-13 PROCEDURE — 93970 EXTREMITY STUDY: CPT

## 2025-08-13 PROCEDURE — 93970 EXTREMITY STUDY: CPT | Performed by: INTERNAL MEDICINE

## 2025-08-14 DIAGNOSIS — I82.501 CHRONIC DEEP VEIN THROMBOSIS (DVT) OF RIGHT LOWER EXTREMITY, UNSPECIFIED VEIN: ICD-10-CM

## 2025-08-14 DIAGNOSIS — Z86.718 HISTORY OF VENOUS THROMBOEMBOLISM: Primary | ICD-10-CM

## 2025-08-19 ENCOUNTER — HOSPITAL ENCOUNTER (OUTPATIENT)
Dept: VASCULAR MEDICINE | Facility: HOSPITAL | Age: 55
Discharge: HOME | End: 2025-08-19
Payer: COMMERCIAL

## 2025-08-19 DIAGNOSIS — Z86.718 HISTORY OF VENOUS THROMBOEMBOLISM: ICD-10-CM

## 2025-08-19 DIAGNOSIS — I82.501 CHRONIC DEEP VEIN THROMBOSIS (DVT) OF RIGHT LOWER EXTREMITY, UNSPECIFIED VEIN: ICD-10-CM

## 2025-08-19 DIAGNOSIS — I82.532 CHRONIC EMBOLISM AND THROMBOSIS OF LEFT POPLITEAL VEIN: ICD-10-CM

## 2025-08-19 PROCEDURE — 93971 EXTREMITY STUDY: CPT | Performed by: SURGERY

## 2025-08-19 PROCEDURE — 93971 EXTREMITY STUDY: CPT

## 2025-08-20 ENCOUNTER — TELEPHONE (OUTPATIENT)
Dept: CARDIOLOGY | Facility: CLINIC | Age: 55
End: 2025-08-20

## 2025-08-20 ENCOUNTER — APPOINTMENT (OUTPATIENT)
Dept: CARDIOLOGY | Facility: CLINIC | Age: 55
End: 2025-08-20
Payer: COMMERCIAL

## 2025-08-20 VITALS
SYSTOLIC BLOOD PRESSURE: 104 MMHG | HEIGHT: 68 IN | OXYGEN SATURATION: 100 % | WEIGHT: 156.9 LBS | HEART RATE: 63 BPM | BODY MASS INDEX: 23.78 KG/M2 | DIASTOLIC BLOOD PRESSURE: 62 MMHG

## 2025-08-20 DIAGNOSIS — Z71.2 ENCOUNTER TO DISCUSS TEST RESULTS: ICD-10-CM

## 2025-08-20 DIAGNOSIS — Z86.718 HISTORY OF VENOUS THROMBOEMBOLISM: Primary | ICD-10-CM

## 2025-08-20 PROBLEM — I82.503 CHRONIC DEEP VEIN THROMBOSIS (DVT) OF BOTH LOWER EXTREMITIES: Status: RESOLVED | Noted: 2025-02-19 | Resolved: 2025-08-20

## 2025-08-20 PROCEDURE — 99214 OFFICE O/P EST MOD 30 MIN: CPT

## 2025-08-20 PROCEDURE — 3008F BODY MASS INDEX DOCD: CPT

## 2025-08-23 LAB
B2 GLYCOPROT1 IGA SER-ACNC: <2 U/ML
B2 GLYCOPROT1 IGG SER-ACNC: <2 U/ML
B2 GLYCOPROT1 IGM SER-ACNC: <2 U/ML
CARDIOLIPIN IGA SER IA-ACNC: <2 APL-U/ML
CARDIOLIPIN IGG SER IA-ACNC: <2 GPL-U/ML
CARDIOLIPIN IGM SER IA-ACNC: <2 MPL-U/ML
D DIMER PPP FEU-MCNC: 0.3 MCG/ML FEU
F5 GENE MUT ANL BLD/T: NORMAL
F5 P.R506Q BLD/T QL: NORMAL
LA 2 SCREEN W REFLEX-IMP: NORMAL
PROT C ACT/NOR PPP: 134 % NORMAL (ref 70–180)
PROT S ACT/NOR PPP: 102 % NORMAL (ref 60–140)
SCREEN APTT: 32 SEC
SCREEN DRVVT: 41 SEC

## 2025-08-27 LAB
B2 GLYCOPROT1 IGA SER-ACNC: <2 U/ML
B2 GLYCOPROT1 IGG SER-ACNC: <2 U/ML
B2 GLYCOPROT1 IGM SER-ACNC: <2 U/ML
CARDIOLIPIN IGA SER IA-ACNC: <2 APL-U/ML
CARDIOLIPIN IGG SER IA-ACNC: <2 GPL-U/ML
CARDIOLIPIN IGM SER IA-ACNC: <2 MPL-U/ML
D DIMER PPP FEU-MCNC: 0.3 MCG/ML FEU
F5 GENE MUT ANL BLD/T: NORMAL
F5 P.R506Q BLD/T QL: NEGATIVE
LA 2 SCREEN W REFLEX-IMP: NORMAL
PROT C ACT/NOR PPP: 134 % NORMAL (ref 70–180)
PROT S ACT/NOR PPP: 102 % NORMAL (ref 60–140)
SCREEN APTT: 32 SEC
SCREEN DRVVT: 41 SEC

## 2025-12-18 ENCOUNTER — APPOINTMENT (OUTPATIENT)
Dept: NEPHROLOGY | Facility: CLINIC | Age: 55
End: 2025-12-18
Payer: COMMERCIAL

## (undated) DEVICE — DRAPE, INSTRUMENT, W/POUCH, STERI DRAPE, 7 X 11 IN, DISPOSABLE, STERILE

## (undated) DEVICE — DRAPE, SHEET, THYROID, W/ARMBOARD COVER, 100 X 121 IN, DISPOSABLE, LF, STERILE

## (undated) DEVICE — DISSECTOR, EXACT, LIGASURE

## (undated) DEVICE — SUTURE, VICRYL, 3-0, 27 IN, SH

## (undated) DEVICE — SUTURE, PROLENE, 5-0, 36 IN, C-1, CV-11, BLUE

## (undated) DEVICE — SUTURE, SILK, 2-0, 18 IN, BLACK

## (undated) DEVICE — CLIP, LIGATING, W/ADHESIVE, WIDE SLOT, SMALL, TITANIUM

## (undated) DEVICE — DRESSING, ADHESIVE, ISLAND, TELFA, 2 X 3.75 IN, LF

## (undated) DEVICE — APPLICATOR, PREP, CHLORAPREP, W/ORANGE TINT, 10.5ML

## (undated) DEVICE — GLOVE, SURGICAL, PROTEXIS NEOPRENE, 7.5, PF, LF

## (undated) DEVICE — Device

## (undated) DEVICE — ADHESIVE, SKIN, MASTISOL, 2/3 CC VIAL

## (undated) DEVICE — SPONGE, DISSECTOR, PEANUT, 3/8, STERILE 5 FOAM HOLDER"

## (undated) DEVICE — MANIFOLD, 4 PORT NEPTUNE STANDARD

## (undated) DEVICE — CLIP, LIGATING, W/ADHESIVE PAD, MEDIUM, TITANIUM

## (undated) DEVICE — TUBE, BLOOD, VACUETTE, K2EDTA, LAVENDER W/BLK RING, 4ML, POLYETHYLENE, PULL CAP

## (undated) DEVICE — SUTURE, MONOCRYLIC, 4-0, P3, MONO 18

## (undated) DEVICE — TOWEL, SURGICAL, NEURO, O/R, 16 X 26, BLUE, STERILE

## (undated) DEVICE — DRESSING, NON-ADHERENT, TELFA, OUCHLESS, 3 X 8 IN, STERILE

## (undated) DEVICE — STRIP, SKIN CLOSURE, STERI STRIP, REINFORCED, 0.5 X 4 IN

## (undated) DEVICE — NEEDLE, HYPODERMIC, 23 GA X 1.5 IN

## (undated) DEVICE — ELECTRODE, ELECTROSURGICAL, BLADE, INSULATED, ENT/IMA, STERILE

## (undated) DEVICE — COVER, CART, 45 X 27 X 48 IN, CLEAR

## (undated) DEVICE — DRAPE, MAGENTIC INSTRUMENT, 12X16

## (undated) DEVICE — SPONGE, GAUZE, XRAY DECT, 16 PLY, 4 X 4, W/MASTER DMT,STERILE